# Patient Record
Sex: FEMALE | Race: BLACK OR AFRICAN AMERICAN | NOT HISPANIC OR LATINO | Employment: OTHER | URBAN - METROPOLITAN AREA
[De-identification: names, ages, dates, MRNs, and addresses within clinical notes are randomized per-mention and may not be internally consistent; named-entity substitution may affect disease eponyms.]

---

## 2017-01-17 ENCOUNTER — LAB VISIT (OUTPATIENT)
Dept: LAB | Facility: HOSPITAL | Age: 67
End: 2017-01-17
Payer: MEDICARE

## 2017-01-17 DIAGNOSIS — Z76.82 ORGAN TRANSPLANT CANDIDATE: ICD-10-CM

## 2017-01-17 PROCEDURE — 86829 HLA CLASS I/II ANTIBODY QUAL: CPT | Mod: PO

## 2017-01-17 PROCEDURE — 86829 HLA CLASS I/II ANTIBODY QUAL: CPT | Mod: 91,PO

## 2017-01-31 LAB — HPRA INTERPRETATION: NORMAL

## 2017-02-07 ENCOUNTER — LAB VISIT (OUTPATIENT)
Dept: LAB | Facility: HOSPITAL | Age: 67
End: 2017-02-07
Payer: MEDICARE

## 2017-02-07 DIAGNOSIS — Z76.82 ORGAN TRANSPLANT CANDIDATE: ICD-10-CM

## 2017-02-07 PROCEDURE — 86829 HLA CLASS I/II ANTIBODY QUAL: CPT | Mod: PO

## 2017-02-07 PROCEDURE — 86829 HLA CLASS I/II ANTIBODY QUAL: CPT | Mod: 91,PO

## 2017-02-17 LAB — HPRA INTERPRETATION: NORMAL

## 2017-03-16 ENCOUNTER — TELEPHONE (OUTPATIENT)
Dept: TRANSPLANT | Facility: CLINIC | Age: 67
End: 2017-03-16

## 2017-03-16 NOTE — TELEPHONE ENCOUNTER
"Phone Dialysis unit.  Hilaria states pt has "Greatly improved" in regards to signing off early.  Signed off early only once in Jan and once in Feb.    Will have pt scheduled for 6 mos re-eval.    "

## 2017-04-02 DIAGNOSIS — Z76.82 ORGAN TRANSPLANT CANDIDATE: Primary | ICD-10-CM

## 2017-04-02 NOTE — PROGRESS NOTES
YEARLY LIST MANAGEMENT NOTE    Yessi Valencia's kidney transplant listing status reviewed.  Patient is due for follow-up appointments in May 2017.  Appointments will be scheduled per protocol.  HLA sample is current and being rec'd on a regular basis.

## 2017-06-01 ENCOUNTER — LAB VISIT (OUTPATIENT)
Dept: LAB | Facility: HOSPITAL | Age: 67
End: 2017-06-01
Payer: MEDICARE

## 2017-06-01 DIAGNOSIS — Z76.82 ORGAN TRANSPLANT CANDIDATE: ICD-10-CM

## 2017-06-02 ENCOUNTER — HOSPITAL ENCOUNTER (OUTPATIENT)
Dept: RADIOLOGY | Facility: HOSPITAL | Age: 67
Discharge: HOME | End: 2017-06-02
Attending: INTERNAL MEDICINE
Payer: MEDICARE

## 2017-06-02 ENCOUNTER — OFFICE VISIT (OUTPATIENT)
Dept: OBSTETRICS AND GYNECOLOGY | Facility: CLINIC | Age: 67
End: 2017-06-02
Payer: MEDICARE

## 2017-06-02 ENCOUNTER — CLINICAL SUPPORT (OUTPATIENT)
Dept: CARDIOLOGY | Facility: CLINIC | Age: 67
End: 2017-06-02
Payer: MEDICARE

## 2017-06-02 VITALS
HEIGHT: 67 IN | SYSTOLIC BLOOD PRESSURE: 126 MMHG | DIASTOLIC BLOOD PRESSURE: 88 MMHG | WEIGHT: 209 LBS | BODY MASS INDEX: 32.8 KG/M2

## 2017-06-02 DIAGNOSIS — I36.9 NONRHEUMATIC TRICUSPID VALVE DISORDER: ICD-10-CM

## 2017-06-02 DIAGNOSIS — Z01.419 NORMAL GYNECOLOGIC EXAMINATION: ICD-10-CM

## 2017-06-02 DIAGNOSIS — Z01.89 ENCOUNTER FOR OTHER SPECIFIED SPECIAL EXAMINATIONS: ICD-10-CM

## 2017-06-02 DIAGNOSIS — Z76.82 ORGAN TRANSPLANT CANDIDATE: ICD-10-CM

## 2017-06-02 DIAGNOSIS — Z11.4 ENCOUNTER FOR SCREENING FOR HIV: ICD-10-CM

## 2017-06-02 DIAGNOSIS — Z12.31 ENCOUNTER FOR SCREENING MAMMOGRAM FOR BREAST CANCER: ICD-10-CM

## 2017-06-02 DIAGNOSIS — Z71.1 CONCERN ABOUT STD IN FEMALE WITHOUT DIAGNOSIS: Primary | ICD-10-CM

## 2017-06-02 LAB
DIASTOLIC DYSFUNCTION: YES
ESTIMATED PA SYSTOLIC PRESSURE: 38.28
RETIRED EF AND QEF - SEE NOTES: 60 (ref 55–65)
TRICUSPID VALVE REGURGITATION: ABNORMAL

## 2017-06-02 PROCEDURE — 87624 HPV HI-RISK TYP POOLED RSLT: CPT | Mod: TXP

## 2017-06-02 PROCEDURE — 88175 CYTOPATH C/V AUTO FLUID REDO: CPT | Performed by: PATHOLOGY

## 2017-06-02 PROCEDURE — 77067 SCR MAMMO BI INCL CAD: CPT | Mod: TC,TXP

## 2017-06-02 PROCEDURE — 77063 BREAST TOMOSYNTHESIS BI: CPT | Mod: 26,TXP,, | Performed by: RADIOLOGY

## 2017-06-02 PROCEDURE — 77067 SCR MAMMO BI INCL CAD: CPT | Mod: 26,TXP,, | Performed by: RADIOLOGY

## 2017-06-02 PROCEDURE — 88141 CYTOPATH C/V INTERPRET: CPT | Mod: ,,, | Performed by: PATHOLOGY

## 2017-06-02 PROCEDURE — G0101 CA SCREEN;PELVIC/BREAST EXAM: HCPCS | Mod: S$PBB,,, | Performed by: NURSE PRACTITIONER

## 2017-06-02 PROCEDURE — 93306 TTE W/DOPPLER COMPLETE: CPT | Mod: PBBFAC,TXP | Performed by: NUCLEAR MEDICINE

## 2017-06-02 PROCEDURE — 99999 PR PBB SHADOW E&M-EST. PATIENT-LVL II: CPT | Mod: PBBFAC,,, | Performed by: NURSE PRACTITIONER

## 2017-06-02 RX ORDER — LORATADINE 10 MG/1
10 TABLET ORAL
COMMUNITY
Start: 2017-01-25 | End: 2021-03-12

## 2017-06-02 RX ORDER — LACTULOSE 10 G/15ML
20 SOLUTION ORAL
COMMUNITY
Start: 2016-05-13 | End: 2017-06-02 | Stop reason: SDUPTHER

## 2017-06-02 RX ORDER — HYDROCODONE BITARTRATE AND ACETAMINOPHEN 10; 325 MG/1; MG/1
1 TABLET ORAL
COMMUNITY
Start: 2017-05-03 | End: 2017-06-02 | Stop reason: SDUPTHER

## 2017-06-02 RX ORDER — HYDRALAZINE HYDROCHLORIDE 100 MG/1
100 TABLET, FILM COATED ORAL
COMMUNITY
Start: 2016-08-01 | End: 2017-06-02 | Stop reason: SDUPTHER

## 2017-06-02 RX ORDER — CINACALCET HYDROCHLORIDE 30 MG/1
TABLET, COATED ORAL
COMMUNITY
Start: 2017-04-12 | End: 2021-03-12

## 2017-06-02 RX ORDER — CLONIDINE HYDROCHLORIDE 0.1 MG/1
0.1 TABLET ORAL
COMMUNITY
Start: 2017-06-01 | End: 2021-03-12

## 2017-06-02 RX ORDER — IRBESARTAN 300 MG/1
300 TABLET ORAL DAILY
COMMUNITY
Start: 2017-04-13 | End: 2021-03-12

## 2017-06-02 RX ORDER — PEN NEEDLE, DIABETIC 30 GX3/16"
NEEDLE, DISPOSABLE MISCELLANEOUS
COMMUNITY
Start: 2014-12-03

## 2017-06-02 RX ORDER — SEVELAMER CARBONATE 800 MG/1
800 TABLET, FILM COATED ORAL
COMMUNITY

## 2017-06-02 RX ORDER — CARVEDILOL 25 MG/1
25 TABLET ORAL
COMMUNITY
Start: 2017-04-13

## 2017-06-02 RX ORDER — ONDANSETRON 4 MG/1
4 TABLET, FILM COATED ORAL
COMMUNITY
Start: 2017-06-01 | End: 2018-06-01

## 2017-06-02 RX ORDER — OMEPRAZOLE 40 MG/1
40 CAPSULE, DELAYED RELEASE ORAL
COMMUNITY
Start: 2016-08-01 | End: 2017-06-02 | Stop reason: SDUPTHER

## 2017-06-02 RX ORDER — CLONIDINE 0.3 MG/24H
PATCH, EXTENDED RELEASE TRANSDERMAL
COMMUNITY
Start: 2017-04-28

## 2017-06-02 RX ORDER — INSULIN LISPRO 100 [IU]/ML
4 INJECTION, SOLUTION INTRAVENOUS; SUBCUTANEOUS
COMMUNITY
Start: 2017-02-27

## 2017-06-02 RX ORDER — CALCITRIOL 0.5 UG/1
0.5 CAPSULE ORAL
COMMUNITY

## 2017-06-02 NOTE — LETTER
June 2, 2017      Janay Vallejo NP  [address]  [place] [address]           [facility]  [address] [address]  Phone: [phone]  Fax: [phone]          Patient: Yessi Valencia   MR Number: [id number]   YOB: 1950   Date of Visit: 6/2/2017       Dear Janay Vallejo:    Thank you for referring Yessi Valencia to me for evaluation. Attached you will find relevant portions of my assessment and plan of care.    If you have questions, please do not hesitate to call me. I look forward to following Yessi Valencia along with you.    Sincerely,    Ingrid Oleary NP    Enclosure  CC:  No Recipients    If you would like to receive this communication electronically, please contact [email or web address] or [phone] to request more information on [employer] access.    For providers and/or their staff who would like to refer a patient to [facility], please contact us through our one-stop-shop provider referral line, Jose Sebastian, at [phone].    If you feel you have received this communication in error or would no longer like to receive these types of communications, please e-mail [email or web address]

## 2017-06-02 NOTE — PROGRESS NOTES
CC: Well woman exam    Yessi Valencia is a 66 y.o. female  presents for well woman exam.  LMP: No LMP recorded. Patient is postmenopausal..  No issues, problems, or complaints. No AUB. Patient has a h/o left breast cancer and is being followed  By oncology.Patient is on kidney HD and on list for transplant.     Past Medical History:   Diagnosis Date    Arthritis     Breast cancer s/p left mastectomy      No chemotherapy or XRT, rx'd with fumera x5 yrs    Chronic kidney disease (CKD), stage V     Diabetes mellitus, type 2 age 47 2015    Diabetic nephropathy     Encounter for blood transfusion     ESRD on dialysis     Heart murmur     Hypertension     Neuromuscular disorder     Seizures     Type II diabetes mellitus with neuropathy      Past Surgical History:   Procedure Laterality Date     SECTION      EYE SURGERY      MASTECTOMY Left      Social History     Social History    Marital status: Single     Spouse name: N/A    Number of children: N/A    Years of education: N/A     Occupational History    Not on file.     Social History Main Topics    Smoking status: Former Smoker    Smokeless tobacco: Never Used      Comment: quit 40+ years ago    Alcohol use No    Drug use: No    Sexual activity: Not on file     Other Topics Concern    Not on file     Social History Narrative    3 grankids. Retired .     Family History   Problem Relation Age of Onset    Hypertension Mother     Diabetes Mother     Cancer Mother      colon ca    Diabetes Father     Heart disease Father     Hypertension Father     Diabetes Sister     Diabetes Brother     Kidney disease Daughter      proteinuria    Diabetes Daughter      OB History      Para Term  AB Living    2     2    SAB TAB Ectopic Multiple Live Births                  Wt 94.8 kg (208 lb 15.9 oz)   BMI 32.73 kg/m²       ROS:  GENERAL: Denies weight gain or weight loss. Feeling well overall.   SKIN: Denies rash  or lesions.   HEAD: Denies head injury or headache.   NODES: Denies enlarged lymph nodes.   CHEST: Denies chest pain or shortness of breath.   CARDIOVASCULAR: Denies palpitations or left sided chest pain.   ABDOMEN: No abdominal pain, constipation, diarrhea, nausea, vomiting or rectal bleeding.   URINARY: No frequency, dysuria, hematuria, or burning on urination.  REPRODUCTIVE: See HPI.   BREASTS: HPI  HEMATOLOGIC: No easy bruisability or excessive bleeding.   MUSCULOSKELETAL: Denies joint pain or swelling.   NEUROLOGIC: Denies syncope or weakness.   PSYCHIATRIC: Denies depression, anxiety or mood swings.    PHYSICAL EXAM:  APPEARANCE: Well nourished, well developed, in no acute distress.  AFFECT: WNL, alert and oriented x 3  SKIN: No acne or hirsutism  NECK: Neck symmetric without masses or thyromegaly  NODES: No inguinal, cervical, axillary, or femoral lymph node enlargement  CHEST: Good respiratory effect  ABDOMEN: Soft.  No tenderness or masses.  PELVIC: Normal external genitalia without lesions.  Normal hair distribution.  Adequate perineal body, normal urethral meatus.  Vagina atrophy.Cervix pink, without lesions, discharge or tenderness.  No significant cystocele or rectocele.  Bimanual exam shows uterus to be normal size, regular, mobile and nontender.  Adnexa without masses or tenderness.    EXTREMITIES: No edema.    PLAN:  Pap exam  STD work-up  Patient was counseled today on A.C.S. Pap guidelines and recommendations for yearly pelvic exams, mammograms and monthly self breast exams; to see her PCP for other health maintenance.

## 2017-06-03 LAB
C TRACH DNA SPEC QL NAA+PROBE: NOT DETECTED
N GONORRHOEA DNA SPEC QL NAA+PROBE: NOT DETECTED

## 2017-06-05 NOTE — PROGRESS NOTES
Please send the echo report to the nephrologist for adjustment of fluid removal/diuresis and BP control

## 2017-06-14 LAB
HPV HR 12 DNA CVX QL NAA+PROBE: NEGATIVE
HPV16 DNA SPEC QL NAA+PROBE: NEGATIVE
HPV18 DNA SPEC QL NAA+PROBE: NEGATIVE

## 2017-06-15 NOTE — PROGRESS NOTES
Please call patient and inform her that pap exam was negative for HPV but positive for ASC-US. Recommendation is to repeat pap exam in 1 year, even though she is 66.

## 2017-07-07 PROCEDURE — 86829 HLA CLASS I/II ANTIBODY QUAL: CPT | Mod: 91,PO,TXP

## 2017-07-07 PROCEDURE — 86829 HLA CLASS I/II ANTIBODY QUAL: CPT | Mod: PO,TXP

## 2017-07-11 NOTE — Clinical Note
July 11, 2017        Yessi Valencia  [address]  [place] [address]         Dear Yessi Valencia:    As part of our commitment to share important information with our transplant patients, we want to provide you with the most current kidney and kidney/pancreas transplant outcomes at the [facility] as published bi-annually by the Scientific Registry for Organ Recipients (SRTR).    For kidney transplants performed between 1/1/2014 and 6/30/2016 the 1-year patient and graft survival rates are as follows:   Kidney-Cadaveric Donor Kidney-Living Donor Kidney/Pancreas   Adults [facility] 1-Year Expected 1-Year National 1-Year [facility] 1-Year Expected 1-Year National 1-Year [facility] 1-Year Expected 1-Year National 1-Year   Graft Survival 93.09% 94.31% 93.92% 97.83% 97.54% 97.75% 98.18% 96.73% 96.15%   Patient Survival 96.56% 96.91% 96.57% 100% 98.88% 98.83% 98.18% 97.52% 97.54%         To learn more about transplant outcomes in the [place] you can go to www.srtr.org.     Please call the Kidney Transplant Office at [phone] or [phone] should you have any questions or if:     Your insurance changes in any way   Your address or phone number changes   There are changes in your health   You are in the hospital or Emergency Room for any reason      Sincerely,  Kidney Transplant Team

## 2017-07-11 NOTE — ADDENDUM NOTE
Encounter addended by: Mare Tejada on: 7/11/2017  3:28 PM<BR>    Actions taken: Letter status changed

## 2017-07-11 NOTE — LETTER
IMPORTANT      July 11, 2017    Yessi Valencia  [address] [place] [address]     Re: [id number]    Dear Mr/Mrs Valencia,    Thank you for choosing [facility] as your health care provider.  We are committed to assisting you with timely insurance filing and payment of your account.  To protect your liability, updated insurance information must be given to us at the time of service and we should be notified immediately if      · Your insurance benefits/plan changes.   You become eligible for any other benefits   Your current plan/coverage terms.    Also, please bring in a copy of your insurance premium payment if you have one of the following types of insurance:    · Coverage from a alf plan.  · Coverage from the Affordable Healthcare Act Plan.  · Coverage from a COBRA plan  · Premium paid by the National Kidney Foundation.    To ensure we have the correct insurance (Medical/Pharmacy) on file and to answer any questions regarding your benefits, please call us at [phone] or [phone] and ask to speak to the kidney  indicated below:      Transplant Dept  [facility]   Heart   Brynn Baron   Kidney (A-K) and Lung  [place] Ashu    Kidney (L-Z)  Briana Alexander   Liver    We look forward to hearing from you soon.    Sincerely,      Transplant Financial Services

## 2017-07-12 LAB — HPRA INTERPRETATION: NORMAL

## 2017-07-13 ENCOUNTER — LAB VISIT (OUTPATIENT)
Dept: LAB | Facility: HOSPITAL | Age: 67
End: 2017-07-13
Payer: MEDICARE

## 2017-07-13 DIAGNOSIS — Z76.82 ORGAN TRANSPLANT CANDIDATE: ICD-10-CM

## 2017-07-13 PROCEDURE — 86829 HLA CLASS I/II ANTIBODY QUAL: CPT | Mod: 91,PO,TXP

## 2017-07-13 PROCEDURE — 86829 HLA CLASS I/II ANTIBODY QUAL: CPT | Mod: PO,TXP

## 2017-08-01 DIAGNOSIS — Z76.82 ORGAN TRANSPLANT CANDIDATE: ICD-10-CM

## 2017-08-09 LAB — HPRA INTERPRETATION: NORMAL

## 2017-08-17 ENCOUNTER — LAB VISIT (OUTPATIENT)
Dept: LAB | Facility: HOSPITAL | Age: 67
End: 2017-08-17
Payer: MEDICARE

## 2017-08-17 DIAGNOSIS — Z76.82 ORGAN TRANSPLANT CANDIDATE: ICD-10-CM

## 2017-08-17 PROCEDURE — 86829 HLA CLASS I/II ANTIBODY QUAL: CPT | Mod: PO,TXP

## 2017-08-17 PROCEDURE — 86829 HLA CLASS I/II ANTIBODY QUAL: CPT | Mod: 91,PO,TXP

## 2017-08-25 ENCOUNTER — HOSPITAL ENCOUNTER (OUTPATIENT)
Dept: RADIOLOGY | Facility: HOSPITAL | Age: 67
Discharge: HOME | End: 2017-08-25
Attending: INTERNAL MEDICINE
Payer: MEDICARE

## 2017-08-25 ENCOUNTER — OFFICE VISIT (OUTPATIENT)
Dept: TRANSPLANT | Facility: CLINIC | Age: 67
End: 2017-08-25
Payer: MEDICARE

## 2017-08-25 VITALS
SYSTOLIC BLOOD PRESSURE: 176 MMHG | HEART RATE: 90 BPM | RESPIRATION RATE: 16 BRPM | DIASTOLIC BLOOD PRESSURE: 85 MMHG | HEIGHT: 65 IN | BODY MASS INDEX: 34.24 KG/M2 | OXYGEN SATURATION: 97 % | WEIGHT: 205.5 LBS | TEMPERATURE: 98 F

## 2017-08-25 DIAGNOSIS — Z99.2 ESRD ON DIALYSIS: Primary | Chronic | ICD-10-CM

## 2017-08-25 DIAGNOSIS — N18.6 ESRD ON DIALYSIS: Primary | Chronic | ICD-10-CM

## 2017-08-25 DIAGNOSIS — Z76.82 ORGAN TRANSPLANT CANDIDATE: ICD-10-CM

## 2017-08-25 DIAGNOSIS — Z22.7 LATENT TUBERCULOSIS BY BLOOD TEST: ICD-10-CM

## 2017-08-25 DIAGNOSIS — E11.21 DIABETIC NEPHROPATHY ASSOCIATED WITH TYPE 2 DIABETES MELLITUS: Chronic | ICD-10-CM

## 2017-08-25 DIAGNOSIS — E66.9 OBESITY (BMI 30.0-34.9): ICD-10-CM

## 2017-08-25 DIAGNOSIS — I10 ESSENTIAL HYPERTENSION: ICD-10-CM

## 2017-08-25 PROCEDURE — 1125F AMNT PAIN NOTED PAIN PRSNT: CPT | Mod: TXP,,, | Performed by: INTERNAL MEDICINE

## 2017-08-25 PROCEDURE — 99213 OFFICE O/P EST LOW 20 MIN: CPT | Mod: PBBFAC,25,TXP

## 2017-08-25 PROCEDURE — 71020 XR CHEST PA AND LATERAL: CPT | Mod: 26,TXP,, | Performed by: RADIOLOGY

## 2017-08-25 PROCEDURE — 99215 OFFICE O/P EST HI 40 MIN: CPT | Mod: S$PBB,TXP,, | Performed by: INTERNAL MEDICINE

## 2017-08-25 PROCEDURE — 4010F ACE/ARB THERAPY RXD/TAKEN: CPT | Mod: TXP,,, | Performed by: INTERNAL MEDICINE

## 2017-08-25 PROCEDURE — 76770 US EXAM ABDO BACK WALL COMP: CPT | Mod: 26,GC,TXP, | Performed by: RADIOLOGY

## 2017-08-25 PROCEDURE — 99999 PR PBB SHADOW E&M-EST. PATIENT-LVL III: CPT | Mod: PBBFAC,TXP,,

## 2017-08-25 PROCEDURE — 1159F MED LIST DOCD IN RCRD: CPT | Mod: TXP,,, | Performed by: INTERNAL MEDICINE

## 2017-08-25 NOTE — PROGRESS NOTES
"   Kidney Transplant Recipient Reevalulation    Referring Physician: Oliver Cardona  Current Nephrologist: Oliver Cardona  Waitlist Status: inactive  Dialysis Start Date: 6/3/2015    Subjective:     CC:  Annual reassessment of kidney transplant candidacy.    HPI:  Ms. Valencia is a 67 y.o. year old Black or  female with ESRD secondary to diabetic nephropathy.  She has been on the wait list for a kidney transplant at [facility] since 6/3/2015. Patient is currently on hemodialysis started on 6/2015. Patient is dialyzing on TTS schedule.  Patient reports that she is tolerating dialysis well.. She has a RUE AV fistula. Patient denies any recent hospitalizations or ED visits.    The patient is here with her daughter, who is a care giver (daughter has a baby with her). She also names other daughter as a possible care giver.    Patient states that she is adherent to dialysis and she was "put on probation last year" but now she is doing fine and not missing any treatments.    She had a skin lesion removed by Dr Gardner () and is positive that was benign but we don't have any records available    She refers being active and motivated with the transplant process.  I reviewed the tests available and they are within the acceptable level. No major c/v barriers that I can see, I discussed this with the patient.    Her current BMI is 34 and she willing to lose more weight     Patient had a mammogram recently which was benign (she has h/o breast cancer in 2007, no evidence of recurrence so far)    Patient had a procedure to her dialysis access in July 17 2017 to "remove a aneurysm" and now she is fully recovered and has a patent dialysis access.    She is dialyzing three times a week and refers some episodes of weakness after dialysis because they "?remove too much fluid". She denies excessive fluid intake. No chest pain or SOB      Review of Systems   Constitutional: Negative for appetite change, fatigue and " "fever.   HENT: Negative for hearing loss, mouth sores and sore throat.    Eyes: Negative for photophobia and visual disturbance.   Respiratory: Negative for cough, chest tightness, shortness of breath and wheezing.    Cardiovascular: Negative for chest pain, palpitations and leg swelling.   Gastrointestinal: Negative for abdominal distention, abdominal pain, blood in stool, constipation, diarrhea, nausea and vomiting.   Genitourinary: Negative for decreased urine volume, difficulty urinating, dysuria, frequency, hematuria, menstrual problem and urgency.        Still makes urine 3 to 5 times a day   Musculoskeletal: Negative for arthralgias, back pain, gait problem and joint swelling.   Skin: Negative for pallor, rash and wound.   Neurological: Negative for dizziness, tremors, syncope, weakness, light-headedness and headaches.   Hematological: Negative for adenopathy. Does not bruise/bleed easily.   Psychiatric/Behavioral: Negative for confusion, dysphoric mood and sleep disturbance. The patient is not nervous/anxious.             Objective:   body mass index is 34.46 kg/m².    Physical Exam   Constitutional: She is oriented to person, place, and time. She appears well-developed and well-nourished. No distress.   BP (!) 176/85 (BP Location: Left arm, Patient Position: Sitting, BP Method: Medium (Automatic)) Comment: Patient has not take BP medicine yet today  Pulse 90   Temp 98 °F (36.7 °C) (Oral)   Resp 16   Ht 5' 4.75" (1.645 m)   Wt 93.2 kg (205 lb 7.5 oz)   SpO2 97%   BMI 34.46 kg/m²      HENT:   Head: Normocephalic and atraumatic.   Right Ear: External ear normal.   Left Ear: External ear normal.   Nose: Nose normal.   Mouth/Throat: Oropharynx is clear and moist. No oropharyngeal exudate.   Eyes: Conjunctivae and EOM are normal. Pupils are equal, round, and reactive to light.   Neck: Normal range of motion. Neck supple. No JVD present. No thyromegaly present.   Cardiovascular: Normal rate, regular rhythm, " normal heart sounds and intact distal pulses.  Exam reveals no gallop and no friction rub.    No murmur heard.  RUE AVG   Pulmonary/Chest: Effort normal and breath sounds normal. No respiratory distress. She has no wheezes. She has no rales.   Abdominal: Soft. Bowel sounds are normal. She exhibits no distension and no mass. There is no tenderness. There is no rebound and no guarding.   Musculoskeletal: Normal range of motion. She exhibits no edema or tenderness.   Neurological: She is alert and oriented to person, place, and time. She has normal reflexes. She displays normal reflexes. No cranial nerve deficit. She exhibits normal muscle tone. Coordination normal.   Skin: Skin is warm and dry. No rash noted. No pallor.   Psychiatric: She has a normal mood and affect. Her behavior is normal. Judgment and thought content normal.   Nursing note and vitals reviewed.      Labs:  Lab Results   Component Value Date    WBC 7.35 05/27/2015    HGB 8.9 (L) 05/27/2015    HCT 28.8 (L) 05/27/2015     05/27/2015    K 4.3 05/27/2015     05/27/2015    CO2 25 05/27/2015    BUN 72 (H) 05/27/2015    CREATININE 4.5 (H) 05/27/2015    EGFRNONAA 9.8 (A) 05/27/2015    CALCIUM 9.0 05/27/2015    PHOS 4.5 05/27/2015    ALBUMIN 2.9 (L) 05/27/2015    AST 19 05/27/2015    ALT 26 05/27/2015    .0 (H) 07/13/2015       No results found for: PREALBUMIN, BILIRUBINUA, GGT, AMYLASE, LIPASE, PROTEINUA, NITRITE, RBCUA, WBCUA    No results found for: HLAABCTYPE    Lab Results   Component Value Date    CPRA 22 06/14/2017    BR1ZATX A3 06/14/2017    CIABCLM A*34:01 06/14/2017    CIIAB Negative 03/07/2017       Labs were reviewed with the patient.    Pre-transplant Workup:   Reviewed with the patient.    Assessment:     ESRD  Type 2 DM, BMI 34  No a pancreas transplant candidate  Diabetic nephropathy  Obesity  Essential Hypertension      Plan:     Transplant Candidacy:   Ms. Valencia is a suitable kidney transplant candidate.  She remains in  overall stable health, and will remain active on the transplant list, will just make sure we review records from skin lesion removed in June 2017 to r/o melanoma or SCC. And also will try to get redords from Urology consult requested in 2015 (please see below)    Patient and dialysis unit state that she is adherent to treatments    Will try to get records from skin lesion procedure to r/o melanoma or squamous cell cancer     patient already cleared by Oncology due to h/o Breast cancer in 2007    In 2015 patient had CT of the abdomen that showed bilateral hydronephrosis and hydroureter with some thickening and trabeculation of the bladder. . A urology consult was recommended but I cant find records of this, however her kidney US from today showed normal size kidney and normal bladder (?).     Will ask our transplant nurse to see if we have any records available addressing this issues        Carlos Mcintosh MD       Follow-up:   In addition to the tests noted in the plan, Ms. Valencia will continue to have reevaluation as per the standing pre-kidney transplant protocol:  1. Monthly blood for PRA  2. Annual return to clinic, except HIV positive, > 65 years of age, or pancreas transplant candidates who will be scheduled to see transplant every 6 months while in pre-transplant phase  3. Annual re-testing: CXR, EKG, yearly mammograms for women over 40 and PSA for males over 40, cardiology follow-up as recommended by initial cardiology pre-transplant evaluation  4. Renal ultrasound every 2 years  5. Baseline colonoscopy after age 50 and repeated as recommended    UNOS Patient Status  Functional Status: 60% - Requires occasional assistance but is able to care for needs  Physical Capacity: Limited Mobility

## 2017-08-25 NOTE — PROGRESS NOTES
Patient was seen in listed '[facility]' transplant clinic for continued evaluation for kidney, kidney/pancreas or pancreas only transplant. The patient attended a group education session that discussed/reviewed the following aspects of transplantation: evaluation and selection committee process, UNOS waitlist management/multiple listings, types of organs offered (KDPI < 85%, KDPI > 85%, PHS increased risk, DCD), financial aspects, surgical procedures, dietary instruction pre- and post-transplant, health maintenance pre- and post-transplant, post-transplant hospitalization and outpatient follow-up, potential to participate in a research protocol, and medication management and side effects. A question and answer session was provided after the presentation.     The patient was seen by all members of the multi-disciplinary team to include: Nephrologist/PA, , Transplant Coordinator, and .      The patient reviewed and signed all consents for evaluation which were witnessed and sent to scanning into the EPIC chart.     The patient was given an education book and plan for further evaluation based on her individual assessment.      The patient was encouraged to call with any questions or concerns.

## 2017-09-11 LAB — HPRA INTERPRETATION: NORMAL

## 2017-09-11 NOTE — PROGRESS NOTES
Transplant Recipient Adult Psychosocial Assessment Update    Yessi Valencia  [address]  [place] [address]    No relevant phone numbers on file.   Home  [phone] (home)   Cell  [phone]  Work  There is no work phone number on file.  E-mail  [email or web address]    Sex: female  YOB: 1950  Age: 67 y.o.    Encounter Date: 2017  [place] Citizen: yes  Primary Language: English   Needed: no    Emergency Contact:  Name: Linda Valencia  Relationship: daughter  Address:  [address]; [place] [address]  Phone Numbers:  [phone] (work), [phone] (mobile)  [phone] (home)    Family/Social Support:   Number of dependents/: Pt reports no dependents.  Marital history: Pt reports never being  and no current significant other. Not currently in a relationship.  Other family dynamics: Pt reports 2 adult children: Linda and Deshawn; 2 sisters, one of whom  last October; 2 brothers: Rolando and Reji. Pt reports having 3 grandchildren.  She states her sister is unable to assist as she cares for grandchildren and a sick . Both brothers are disabled.     Household Composition:    Pt, her dtr, 46 y/o Ambika Valencia and grndtr, 3 y/o Jaky all live together.  Ambika drives.       Do you and your caregivers have access to reliable transportation? yes     PRIMARY CAREGIVER: Linda Valencia will be primary caregiver, phone number [phone].   provided in-depth information to patient regarding pre- and post-transplant caregiver role.  Patient and dtr verbalizes understanding of the education provided today.   strongly encourages patient and caregiver to have concrete plan regarding post-transplant care giving, including back-up caregiver(s) to ensure care giving needs are met as needed.  Patient verbalizes understanding of caregiver responsibilities.        provided in-depth information to patient  and caregiver regarding pre- and post-transplant caregiver role.   strongly encourages patient and caregiver to have concrete plan regarding post-transplant care giving, including back-up caregiver(s) to ensure care giving needs are met as needed.    Patient and Caregiver states understanding all aspects of caregiver role/commitment and is able/willing/committed to being caregiver to the fullest extent necessary.    Patient and Caregiver verbalizes understanding of the education provided today and caregiver responsibilities.         remains available. Patient and Caregiver agree to contact  in a timely manner if concerns arise.      Able to take time off work without financial concerns: yes     Additional Significant Others who will Assist with Transplant:  Name: Deshawn Mendez   Age: 27  Phone: [phone]  City: [place] State: [place]  Relationship: daughter  Does person drive? yes    Living Will: no Education and information provided to pt.  Healthcare Power of : no Education and information provided to pt.  Advance Directives on file: <<no information> per medical record.  Verbally reviewed LW/HCPA information.   provided patient with copy of LW/HCPA documents and provided education on completion of forms.  Pt stated she would like Linda to be primary HCPOA with Deshawn giron as secondary.    Living Donors: No. Education and resource information given to patient.    Highest Education Level: Attended College/Technical School  Reading Ability: college  Reports difficulty with: seeing. Pt reports issues with eyesight in left eye and is related to pt's diabetes. Pt wears glasses.  Learns Best By:  Pt reports learning best by written and hands-on instruction.     Status: no  VA Benefits: no     Working for Income: Yes  If no, reason not working: Patient Choice - Retired  Patient is currently employed as part time  at [employer].      Spouse/Significant Other Employment: Pt reports no current significant other.    Disabled: yes: date disability began: 2014, due to: ESRD and diabetes.  She is now on Social Security group home.    Monthly Income:  SSI: $832  Able to afford all costs now and if transplanted, including medications: yes Pt confirms previous reports of pt's daughter  will work on fundraising to assist with the financial aspect of transplant. Pt denies any fundraising done to date.  Patient and Caregiver verbalizes understanding of personal responsibilities related to transplant costs and the importance of having a financial plan to ensure that patients transplant costs are fully covered.       provided fundraising information/education. Patient and Caregiververbalizes understanding.   remains available.    Insurance:   Payor/Plan Subscr  Sex Relation Sub. Ins. ID Effective Group Num   1. MEDICARE - [place]* KANWAL EMERSON 1950 Female  [id number] 9/1/15                                    [address]   2. [employer] CROSS * KANWAL EMERSON 1950 Female  [id number]                                   [address] [address]       2. BLUE CROSS BL* KANWAL EMERSON 1950 Female  [id number]                                   [address] [address]       Primary Insurance (for UNOS reporting): Public Insurance - Medicare FFS (Fee For Service)  Secondary Insurance (for UNOS reporting): Private Insurance  Pt reports pt's BCBS premium is being paid through [facility] (apprxly $150). SW provided education re:  Pt needing to pay for own premium post transplant and fundraising.  Pt verbalizes understanding.  SW remains available    Patient and Caregiver verbalizes clear understanding that patient may experience difficulty obtaining and/or be denied insurance coverage post-surgery. This includes and is not limited to disability insurance, life insurance, health insurance, burial insurance,  "long term care insurance, and other insurances.      Patient and Caregiver also reports understanding that future health concerns related to or unrelated to transplantation may not be covered by patient's insurance.  Resources and information provided and reviewed.     Patient and Caregiver provides verbal permission to release any necessary information to outside resources for patient care and discharge planning.  Resources and information provided are reviewed.      Dialysis Adherence: Patient reports adherence with all aspects of plan or care, including medications, appointments, diet, and dialysis.   SW spoke with Irene De Guzman who stated, "She comes to every treatment and has signed off 15 minutes early four times in the past three months." .  No psychosocial concerns.    Infusion Service: patient utilizing? no  Home Health: patient utilizing? no  DME: diabetic supplies and BP cuff  Pulmonary/Cardiac Rehab: Pt denies   ADLS:  Pt reports difficulty with driving due to eyesight, with walking due to neuropathy. Pt reports no difficulties with bathing, cooking, housekeeping, eating, shopping, and taking medication.    Adherence:   Pt reports good adherence with medications, dialysis, and health regimen.  Adherence education and counseling provided.     Per History Section:  Past Medical History:   Diagnosis Date    Arthritis     Breast cancer s/p left mastectomy 2007     No chemotherapy or XRT, rx'd with fumera x5 yrs    Chronic kidney disease (CKD), stage V     Diabetes mellitus, type 2 age 47 5/27/2015    Diabetic nephropathy     Encounter for blood transfusion     ESRD on dialysis     Heart murmur     Hypertension     Neuromuscular disorder     Obesity (BMI 30.0-34.9) 8/25/2017    Seizures     Type II diabetes mellitus with neuropathy      History   Substance Use Topics    Smoking status: Former Smoker    Smokeless tobacco: Never Used      Comment: quit 40+ years ago    Alcohol Use: No "          Comment: Pt reports last drinking 5 years ago and reports only drinking socially at that time  History   Drug Use No     History   Sexual Activity    Sexual activity: Not on file       Per Today's Psychosocial:  Tobacco: none, patient denies any use.  Alcohol: Pt reports last drinking 5 years ago and reports only drinking socially at that time. .  Illicit Drugs/Non-prescribed Medications: none, patient denies any use.    Patient states clear understanding of the potential impact of substance use as it relates to transplant candidacy and is aware of possible random substance screening.  Substance abstinence/cessation counseling, education and resources provided and reviewed.     Arrests/DWI/Treatment/Rehab: patient denies    Psychiatric History:    Mental Health: Pt reports no history of or current mental health issues or concerns.   Psychiatrist/Counselor: Pt denies seeing a mental health professional.  Medications:  Pt denies taking medications for mental health reasons.  Suicide/Homicide Issues: Pt denies any history of or current suicidal or homicidal ideations.    Safety at home: Pt reports no safety concerns in household.    Knowledge: Patient and Caregiver states having clear understanding and realistic expectations regarding the potential risks and potential benefits of organ transplantation and organ donation and agrees to discuss with health care team members and support system members, as well as to utilize available resources and express questions and/or concerns in order to further facilitate the pt informed decision-making.  Resources and information provided and reviewed.    Patient and Caregiver is aware of [facility]'s affiliation and/or partnership with agencies in home health care, LTAC, SNF, [facility], and other hospitals and clinics.    Understanding: Patient and Caregiver reports having a clear understanding of the many lifetime commitments involved with being a transplant recipient, including  costs, compliance, medications, lab work, procedures, appointments, concrete and financial planning, preparedness, timely and appropriate communication of concerns, abstinence (ETOH, tobacco, illicit non-prescribed drugs), adherence to all health care team recommendations, support system and caregiver involvement, appropriate and timely resource utilization and follow-through, mental health counseling as needed/recommended, and patient and caregiver responsibilities.  Social Service Handbook, resources and detailed educational information provided and reviewed.  Educational information provided.    Patient reports a clear understanding that risks and benefits may be involved with organ transplantation and with organ donation.       Patient also reports clear understanding that psychosocial risk factors may affect patient, and include but are not limited to feelings of depression, generalized anxiety, anxiety regarding dependence on others, post traumatic stress disorder, feelings of guilt and other emotional and/or mental concerns, and/or exacerbation of existing mental health concerns.  Detailed resources provided and discussed.      Patient agrees to access appropriate resources in a timely manner as needed and/or as recommended, and to communicate concerns appropriately.  Patient also reports a clear understanding of treatment options available.     Patient verbalized understanding of the expectations of the transplant process. SW educated pt on mental health concerns as it pertains to the transplant process. Pt reports being open to seeing psych for talk therapy and medications if necessary and agrees to contact the transplant team if the process becomes too overwhelming. SW provided education and emotional support regarding the transplant process. SW remains available.    Patient received education in a group setting.   reviewed education, provided additional information, and answered  "questions.    Feelings or Concerns: Pt denies having any concerns and/or overwhelming feelings regarding transplant at this time. Pt reports high motivation to pursue organ transplant at this time.    Coping: Pt reports coping well with the transplant process at this time and reports reading, going to social events, walking, taking grandkids to park, working part time, watching tv, listening to gospel music and sewing as ways to cope. Pt [facility] in [place], [place] with Dr. Celso Vazquez presiding. "I trust in God."    Goals: Pt reports "living a long healthy life" as a goal for after transplant.  SW provided support and education. Pt verbalizes understanding that transplant is not a guarantee of ending dialysis and life after transplantation will be a "new normal" post transplant.  SW remains available.    Interview Behavior: Patient and Caregiver presents as alert and oriented x 4, good eye contact, calm and communicative. She was accompanied by her dtr, Linda who presented as supportive of pt's pursuit of transplant.          Transplant Social Work - Candidacy  Assessment/Plan:     Psychosocial Suitability: Patient presents as an acceptable candidate for kidney transplant at this time. Based on psychosocial risk factors, patient presents as medium, due to history of non-adherence to dialysis and limited caregiver system.      Recommendations/Additional Comments:  counseled patient extensively on fundraising, housing, transportation, caregiver plan and adherence.   recommends fundraising, caregiver plan and adherence.  Patient [facility].   Patient verbalizes understanding.   remains available.      Davida Avilez LCSW           "

## 2017-09-13 ENCOUNTER — TELEPHONE (OUTPATIENT)
Dept: TRANSPLANT | Facility: CLINIC | Age: 67
End: 2017-09-13

## 2017-09-13 NOTE — TELEPHONE ENCOUNTER
Compliance check:  Dialysis unit reports in the past three months pt has had two no shows due to illness (8/19/17 and 7/20/17), four AMAs due to illness and clotting, none over 15 minutes, labs PTH 1111 on 8/10/17, transportation no concerns and family support no concerns.    Reported by fax back form

## 2017-09-21 ENCOUNTER — LAB VISIT (OUTPATIENT)
Dept: LAB | Facility: HOSPITAL | Age: 67
End: 2017-09-21
Payer: MEDICARE

## 2017-09-21 DIAGNOSIS — Z76.82 ORGAN TRANSPLANT CANDIDATE: ICD-10-CM

## 2017-09-21 PROCEDURE — 86833 HLA CLASS II HIGH DEFIN QUAL: CPT | Mod: PO,TXP

## 2017-09-21 PROCEDURE — 86832 HLA CLASS I HIGH DEFIN QUAL: CPT | Mod: PO,TXP

## 2017-10-16 LAB — HPRA INTERPRETATION: NORMAL

## 2017-10-21 LAB
CLASS I ANTIBODIES - LUMINEX: NORMAL
CLASS II ANTIBODIES - LUMINEX: NEGATIVE
CPRA %: 1
SERUM COLLECTION DT - LUMINEX CLASS I: NORMAL
SERUM COLLECTION DT - LUMINEX CLASS II: NORMAL
SPCL1 TESTING DATE: NORMAL
SPCL2 TESTING DATE: NORMAL
SPCLU TESTING DATE: NORMAL

## 2018-01-02 ENCOUNTER — LAB VISIT (OUTPATIENT)
Dept: LAB | Facility: HOSPITAL | Age: 68
End: 2018-01-02
Payer: MEDICARE

## 2018-01-02 DIAGNOSIS — Z76.82 ORGAN TRANSPLANT CANDIDATE: ICD-10-CM

## 2018-01-02 PROCEDURE — 86833 HLA CLASS II HIGH DEFIN QUAL: CPT | Mod: PO,TXP

## 2018-01-02 PROCEDURE — 86832 HLA CLASS I HIGH DEFIN QUAL: CPT | Mod: PO,TXP

## 2018-01-04 LAB — HPRA INTERPRETATION: NORMAL

## 2018-01-19 LAB
CLASS I ANTIBODY COMMENTS - LUMINEX: NORMAL
CLASS II ANTIBODIES - LUMINEX: NORMAL
CPRA %: 0
SERUM COLLECTION DT - LUMINEX CLASS I: NORMAL
SERUM COLLECTION DT - LUMINEX CLASS II: NORMAL
SPCL1 TESTING DATE: NORMAL
SPCL2 TESTING DATE: NORMAL
SPCLU TESTING DATE: NORMAL

## 2018-02-28 DIAGNOSIS — I13.10 HYPERTENSIVE HEART AND CHRONIC KIDNEY DISEASE: ICD-10-CM

## 2018-02-28 DIAGNOSIS — Z76.82 ORGAN TRANSPLANT CANDIDATE: Primary | ICD-10-CM

## 2018-02-28 NOTE — PROGRESS NOTES
YEARLY LIST MANAGEMENT NOTE    Yessi Valencia's kidney transplant listing status reviewed.  Patient is due for follow-up appointments in April 2018.  Appointments will be scheduled per protocol.  HLA sample is current and being rec'd on a regular basis.

## 2018-03-16 ENCOUNTER — LAB VISIT (OUTPATIENT)
Dept: LAB | Facility: HOSPITAL | Age: 68
End: 2018-03-16
Payer: MEDICARE

## 2018-03-16 DIAGNOSIS — Z76.82 ORGAN TRANSPLANT CANDIDATE: ICD-10-CM

## 2018-03-16 PROCEDURE — 86832 HLA CLASS I HIGH DEFIN QUAL: CPT | Mod: PO,TXP

## 2018-03-16 PROCEDURE — 86833 HLA CLASS II HIGH DEFIN QUAL: CPT | Mod: PO,TXP

## 2018-03-21 LAB — HPRA INTERPRETATION: NORMAL

## 2018-04-02 LAB
CLASS I ANTIBODY COMMENTS - LUMINEX: NORMAL
CLASS II ANTIBODIES - LUMINEX: NEGATIVE
CPRA %: 0
SERUM COLLECTION DT - LUMINEX CLASS I: NORMAL
SERUM COLLECTION DT - LUMINEX CLASS II: NORMAL
SPCL1 TESTING DATE: NORMAL
SPCL2 TESTING DATE: NORMAL
SPCLU TESTING DATE: NORMAL

## 2018-05-11 ENCOUNTER — OFFICE VISIT (OUTPATIENT)
Dept: TRANSPLANT | Facility: CLINIC | Age: 68
End: 2018-05-11
Payer: MEDICARE

## 2018-05-11 ENCOUNTER — HOSPITAL ENCOUNTER (OUTPATIENT)
Dept: CARDIOLOGY | Facility: CLINIC | Age: 68
Discharge: HOME | End: 2018-05-11
Attending: INTERNAL MEDICINE
Payer: MEDICARE

## 2018-05-11 ENCOUNTER — CLINICAL SUPPORT (OUTPATIENT)
Dept: CARDIOLOGY | Facility: CLINIC | Age: 68
End: 2018-05-11
Attending: INTERNAL MEDICINE
Payer: MEDICARE

## 2018-05-11 VITALS
SYSTOLIC BLOOD PRESSURE: 159 MMHG | RESPIRATION RATE: 16 BRPM | HEART RATE: 84 BPM | OXYGEN SATURATION: 99 % | TEMPERATURE: 98 F | HEIGHT: 64 IN | WEIGHT: 204.38 LBS | DIASTOLIC BLOOD PRESSURE: 80 MMHG | BODY MASS INDEX: 34.89 KG/M2

## 2018-05-11 DIAGNOSIS — E66.9 OBESITY (BMI 30.0-34.9): ICD-10-CM

## 2018-05-11 DIAGNOSIS — N32.0 BLADDER OUTLET OBSTRUCTION: ICD-10-CM

## 2018-05-11 DIAGNOSIS — Z22.7 LATENT TUBERCULOSIS BY BLOOD TEST: ICD-10-CM

## 2018-05-11 DIAGNOSIS — I10 ESSENTIAL HYPERTENSION: ICD-10-CM

## 2018-05-11 DIAGNOSIS — E11.21 TYPE 2 DIABETES MELLITUS WITH DIABETIC NEPHROPATHY, UNSPECIFIED WHETHER LONG TERM INSULIN USE: Chronic | ICD-10-CM

## 2018-05-11 DIAGNOSIS — N18.6 ESRD ON DIALYSIS: Primary | Chronic | ICD-10-CM

## 2018-05-11 DIAGNOSIS — Z76.82 ORGAN TRANSPLANT CANDIDATE: ICD-10-CM

## 2018-05-11 DIAGNOSIS — I35.9 NONRHEUMATIC AORTIC VALVE DISORDER: ICD-10-CM

## 2018-05-11 DIAGNOSIS — E11.21 DIABETIC NEPHROPATHY ASSOCIATED WITH TYPE 2 DIABETES MELLITUS: Chronic | ICD-10-CM

## 2018-05-11 DIAGNOSIS — Z99.2 ESRD ON DIALYSIS: Primary | Chronic | ICD-10-CM

## 2018-05-11 DIAGNOSIS — I13.10 HYPERTENSIVE HEART AND CHRONIC KIDNEY DISEASE: ICD-10-CM

## 2018-05-11 DIAGNOSIS — Z76.82 AWAITING ORGAN TRANSPLANT STATUS: ICD-10-CM

## 2018-05-11 LAB
DIASTOLIC DYSFUNCTION: NO
ESTIMATED PA SYSTOLIC PRESSURE: 34.36
RETIRED EF AND QEF - SEE NOTES: 65 (ref 55–65)
TRICUSPID VALVE REGURGITATION: NORMAL

## 2018-05-11 PROCEDURE — 93016 CV STRESS TEST SUPVJ ONLY: CPT | Mod: S$PBB,TXP,, | Performed by: INTERNAL MEDICINE

## 2018-05-11 PROCEDURE — 93306 TTE W/DOPPLER COMPLETE: CPT | Mod: PBBFAC,TXP | Performed by: INTERNAL MEDICINE

## 2018-05-11 PROCEDURE — 78452 HT MUSCLE IMAGE SPECT MULT: CPT | Mod: PBBFAC,TXP | Performed by: INTERNAL MEDICINE

## 2018-05-11 PROCEDURE — 99215 OFFICE O/P EST HI 40 MIN: CPT | Mod: S$PBB,GC,TXP, | Performed by: INTERNAL MEDICINE

## 2018-05-11 PROCEDURE — 99213 OFFICE O/P EST LOW 20 MIN: CPT | Mod: PBBFAC,25,TXP | Performed by: INTERNAL MEDICINE

## 2018-05-11 PROCEDURE — 99999 PR PBB SHADOW E&M-EST. PATIENT-LVL III: CPT | Mod: PBBFAC,TXP,, | Performed by: INTERNAL MEDICINE

## 2018-05-11 PROCEDURE — 93018 CV STRESS TEST I&R ONLY: CPT | Mod: S$PBB,TXP,, | Performed by: INTERNAL MEDICINE

## 2018-05-11 RX ORDER — NAPROXEN SODIUM 220 MG/1
81 TABLET, FILM COATED ORAL DAILY
COMMUNITY

## 2018-05-11 NOTE — PROGRESS NOTES
Transplant Recipient Adult Psychosocial Assessment Update    Yessi Valencia  [address]  [place] [address]    Telephone Information:   Mobile [phone]   Home  [phone] (home)   Cell  [phone]  Work  There is no work phone number on file.  E-mail  [email or web address] Messages    Sex: female  YOB: 1950  Age: 67 y.o.    Encounter Date: 2018  [place] Citizen: yes  Primary Language: English   Needed: no    Emergency Contact:  Name: Linda Valencia  Relationship: daughter  Address:  [address]; [place] [address]  Phone Numbers:  [phone] (work), [phone] (mobile)  [phone] (home)    Family/Social Support:   Number of dependents/: Pt reports no dependents.  Marital history: Pt reports never being  and no current significant other. Not currently in a relationship.  Other family dynamics: Pt reports 2 adult children: Linda and Deshawn; 2 sisters, one of whom  last October; 2 brothers: Rolando and Reji. Pt reports having 3 grandchildren.  She states her sister is unable to assist as she cares for grandchildren and a sick . Both brothers are disabled.     Household Composition:    Pt reports she lives alone and drive herself.       Do you and your caregivers have access to reliable transportation? yes     PRIMARY CAREGIVER: Linda Valencia will be primary caregiver, phone number [phone].   provided in-depth information to patient regarding pre- and post-transplant caregiver role.  Patient and dtr verbalizes understanding of the education provided today.   strongly encourages patient and caregiver to have concrete plan regarding post-transplant care giving, including back-up caregiver(s) to ensure care giving needs are met as needed.  Patient verbalizes understanding of caregiver responsibilities.        provided in-depth information to patient and caregiver regarding pre- and  post-transplant caregiver role.   strongly encourages patient and caregiver to have concrete plan regarding post-transplant care giving, including back-up caregiver(s) to ensure care giving needs are met as needed.    Patient and Caregiver states understanding all aspects of caregiver role/commitment and is able/willing/committed to being caregiver to the fullest extent necessary.    Patient and Caregiver verbalizes understanding of the education provided today and caregiver responsibilities.         remains available. Patient and Caregiver agree to contact  in a timely manner if concerns arise.      Able to take time off work without financial concerns: yes     Additional Significant Others who will Assist with Transplant:  Name: Deshawn Mendez   Age: 27  Phone: [phone]  City: [place] State: [place]  Relationship: daughter  Does person drive? yes    Living Will: no Education and information provided to pt.  Healthcare Power of : no Education and information provided to pt.  Advance Directives on file: <<no information> per medical record.  Verbally reviewed LW/HCPA information.   provided patient with copy of LW/HCPA documents and provided education on completion of forms.  Pt stated she would like Linda to be primary HCPOA with Deshawn giron as secondary.    Living Donors: No. Education and resource information given to patient.    Highest Education Level: Attended College/Technical School  Reading Ability: college  Reports difficulty with: seeing. Pt reports issues with eyesight in left eye and is related to pt's diabetes. Pt wears glasses.  Learns Best By:  Pt reports learning best by written and hands-on instruction.     Status: no  VA Benefits: no     Working for Income: Yes  If no, reason not working: Patient Choice - Retired  Patient is currently employed as part time  at [employer].     Spouse/Significant Other Employment: Pt  reports no current significant other.    Disabled: yes: date disability began: 2014, due to: ESRD and diabetes.  She is now on Social Security detention.    Monthly Income:  SSI: $832  Able to afford all costs now and if transplanted, including medications: yes Pt confirms previous reports of pt's daughter  will work on fundraising to assist with the financial aspect of transplant. Pt denies any fundraising done to date. Pt is concerned about about affording medication after transplant. SW educated pt on medication formulary lists and encouraged her to call her insurance company to determine what price her medication may be. Pt's daughters reports she will assist if needed.   Patient and Caregiver verbalizes understanding of personal responsibilities related to transplant costs and the importance of having a financial plan to ensure that patients transplant costs are fully covered.       provided fundraising information/education. Patient and Caregiververbalizes understanding.   remains available.    Insurance:   Payor/Plan Subscr  Sex Relation Sub. Ins. ID Effective Group Num   1. MEDICARE - ME* KANWAL EMERSON 1950 Female  [id number] 9/1/15                                    [address]   2. BLUE CROSS BL* KANWAL EMERSON 1950 Female  [id number]                                   [address] [address]       Primary Insurance (for UNOS reporting): Public Insurance - Medicare FFS (Fee For Service)  Secondary Insurance (for UNOS reporting): Private Insurance  Pt reports pt's BCBS premium is being paid through [facility] (apprxly $150). SW provided education re:  Pt needing to pay for own premium post transplant and fundraising.  Pt verbalizes understanding.  SW remains available.    Patient and Caregiver verbalizes clear understanding that patient may experience difficulty obtaining and/or be denied insurance coverage post-surgery. This includes and is not  limited to disability insurance, life insurance, health insurance, burial insurance, long term care insurance, and other insurances.      Patient and Caregiver also reports understanding that future health concerns related to or unrelated to transplantation may not be covered by patient's insurance.  Resources and information provided and reviewed.     Patient and Caregiver provides verbal permission to release any necessary information to outside resources for patient care and discharge planning.  Resources and information provided are reviewed.      Dialysis Adherence: Patient reports adherence with all aspects of plan or care, including medications, appointments, diet, and dialysis.   Please see separate note for adherence check.    Infusion Service: patient utilizing? no  Home Health: patient utilizing? no  DME: diabetic supplies and BP cuff  Pulmonary/Cardiac Rehab: Pt denies   ADLS:  Pt reports difficulty with driving due to eyesight, with walking due to neuropathy. Pt reports no difficulties with bathing, cooking, housekeeping, eating, shopping, and taking medication.    Adherence:   Pt reports good adherence with medications, dialysis, and health regimen.  Adherence education and counseling provided.     Per History Section:  Past Medical History:   Diagnosis Date    Arthritis     Breast cancer s/p left mastectomy 2007     No chemotherapy or XRT, rx'd with fumera x5 yrs    Chronic kidney disease (CKD), stage V     Diabetes mellitus, type 2 age 47 5/27/2015    Diabetic nephropathy     Encounter for blood transfusion     ESRD on dialysis     Heart murmur     Hypertension     Neuromuscular disorder     Obesity (BMI 30.0-34.9) 8/25/2017    Seizures     Type II diabetes mellitus with neuropathy      History   Substance Use Topics    Smoking status: Former Smoker    Smokeless tobacco: Never Used      Comment: quit 40+ years ago    Alcohol Use: No          Comment: Pt reports last drinking 5 years  ago and reports only drinking socially at that time  History   Drug Use No     History   Sexual Activity    Sexual activity: Not on file       Per Today's Psychosocial:  Tobacco: none, patient denies any use.  Alcohol: Pt reports last drinking 5 years ago and reports only drinking socially at that time. .  Illicit Drugs/Non-prescribed Medications: none, patient denies any use.    Patient states clear understanding of the potential impact of substance use as it relates to transplant candidacy and is aware of possible random substance screening.  Substance abstinence/cessation counseling, education and resources provided and reviewed.     Arrests/DWI/Treatment/Rehab: patient denies    Psychiatric History:    Mental Health: Pt reports no history of or current mental health issues or concerns.   Psychiatrist/Counselor: Pt denies seeing a mental health professional.  Medications:  Pt denies taking medications for mental health reasons.  Suicide/Homicide Issues: Pt denies any history of or current suicidal or homicidal ideations.    Safety at home: Pt reports no safety concerns in household.    Knowledge: Patient and Caregiver states having clear understanding and realistic expectations regarding the potential risks and potential benefits of organ transplantation and organ donation and agrees to discuss with health care team members and support system members, as well as to utilize available resources and express questions and/or concerns in order to further facilitate the pt informed decision-making.  Resources and information provided and reviewed.    Patient and Caregiver is aware of [facility]'s affiliation and/or partnership with agencies in home health care, LTAC, SNF, [facility], and other hospitals and clinics.    Understanding: Patient and Caregiver reports having a clear understanding of the many lifetime commitments involved with being a transplant recipient, including costs, compliance, medications, lab work,  procedures, appointments, concrete and financial planning, preparedness, timely and appropriate communication of concerns, abstinence (ETOH, tobacco, illicit non-prescribed drugs), adherence to all health care team recommendations, support system and caregiver involvement, appropriate and timely resource utilization and follow-through, mental health counseling as needed/recommended, and patient and caregiver responsibilities.  Social Service Handbook, resources and detailed educational information provided and reviewed.  Educational information provided.    Patient reports a clear understanding that risks and benefits may be involved with organ transplantation and with organ donation.       Patient also reports clear understanding that psychosocial risk factors may affect patient, and include but are not limited to feelings of depression, generalized anxiety, anxiety regarding dependence on others, post traumatic stress disorder, feelings of guilt and other emotional and/or mental concerns, and/or exacerbation of existing mental health concerns.  Detailed resources provided and discussed.      Patient agrees to access appropriate resources in a timely manner as needed and/or as recommended, and to communicate concerns appropriately.  Patient also reports a clear understanding of treatment options available.     Patient verbalized understanding of the expectations of the transplant process. SW educated pt on mental health concerns as it pertains to the transplant process. Pt reports being open to seeing psych for talk therapy and medications if necessary and agrees to contact the transplant team if the process becomes too overwhelming. SW provided education and emotional support regarding the transplant process. SW remains available.    Patient received education in a group setting.   reviewed education, provided additional information, and answered questions.    Feelings or Concerns: Pt denies having  "any concerns and/or overwhelming feelings regarding transplant at this time. Pt reports high motivation to pursue organ transplant at this time.    Coping: Pt reports coping well with the transplant process at this time and reports reading, going to social events, walking, taking grandkids to park, working part time, watching tv, listening to gospel music and sewing as ways to cope. Pt reports Religious home as [facility] in [place], [place] with Dr. Celso Vazquez presiding. "I trust in God."    Goals: Pt reports "living a long healthy life" as a goal for after transplant.  SW provided support and education. Pt verbalizes understanding that transplant is not a guarantee of ending dialysis and life after transplantation will be a "new normal" post transplant.  SW remains available.    Interview Behavior: Patient and Caregiver presents as alert and oriented x 4, good eye contact, calm and communicative. She was accompanied by her dtr, Linda who presented as supportive of pt's pursuit of transplant.          Transplant Social Work - Candidacy  Assessment/Plan:     Psychosocial Suitability: Patient presents as an acceptable candidate for kidney transplant at this time. Based on psychosocial risk factors, patient presents as low risk, due to stable caregiver plan, financial plan, and lack of psychosocial concerns at this time.      Recommendations/Additional Comments: SW recommends that pt conduct fundraising to assist pt with pay for cost of medication, food,gas, and other transplant related expenses. SW recommends that pt remain free of all tobacco,ETOH, and substance use. SW remains available to assist with any concerns that may arise as pt navigates through the transplant process.      Alayna Antonio LMSW         "

## 2018-05-11 NOTE — LETTER
May 14, 2018        Oliver Cardona  [address]  [place] [address]  Phone: [phone]  Fax: [phone]             [facility]  [address]  [facility] [address]  Phone: [phone]   Patient: Yessi Valencia   MR Number: [id number]   YOB: 1950   Date of Visit: 5/11/2018       Dear Dr. Oliver Cardona    Thank you for referring Yessi Valencia to me for evaluation. Attached you will find relevant portions of my assessment and plan of care.    If you have questions, please do not hesitate to call me. I look forward to following Yessi Valencia along with you.    Sincerely,    Gaby Dao MD    Enclosure    If you would like to receive this communication electronically, please contact [email or web address] or [phone] to request [employer] Link access.    [employer] Link is a tool which provides read-only access to select patient information with whom you have a relationship. Its easy to use and provides real time access to review your patients record including encounter summaries, notes, results, and demographic information.    If you feel you have received this communication in error or would no longer like to receive these types of communications, please e-mail [email or web address]

## 2018-05-11 NOTE — PROGRESS NOTES
Kidney Transplant Recipient Reevalulation    Referring Physician: Oliver Cardona  Current Nephrologist: Oliver Cardona  Waitlist Status: active  Dialysis Start Date: 6/3/2015    Subjective:     CC:  Six month reassessment of kidney transplant candidacy.    HPI:  Ms. Valencia is a 67 y.o. year old Black or  female with ESRD secondary to diabetic nephropathy.  She has been on the wait list for a kidney transplant at [facility] since 6/3/2015. Patient is currently on hemodialysis started on 6/3/2015. Patient is dialyzing on TTS schedule.  Patient reports that she is tolerating dialysis well.. She has a RUE AV fistula. Patient denies any recent hospitalizations or ED visits. Since last visit 8/2017, she was doing well in general, no hospital admission. Reported missed once session of HD when her sister passed away. Patient reported that her PTH was noted high and she had intolerance with Sensipar, GI Sx ( N and V) she reported that she will be started on IV Sensipar 6/2018. Also reported compliance with HD, reported KT/v was >1.2 consistently. She had swellings and hematoma in the AV fistula and she got fistulagram and stented 2/2018, fistula works with no problem.   Patient reported that she still urinated around 500cc daily.          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   Respiratory: Denies shortness of breath, dyspnea on exertion, orthopnea, wheezing, hemoptysis, denies known TB exposure or history of positive TB skin test  Cardiovascular: Denies chest pain, palpitations, history of MI, history of stroke, history of DVT  Gastrointestinal: Denies abdominal pain, nausea/vomiting/diarrhea/constipation. Denies history of GI bleeding or ulcers. +ve GERD on PPI daily.    Genitourinary: Denies history of kidney stones, recurrent UTI's, history of urinary obstruction, hematuria,  "dysuria, urinary frequency, incontinence  Musculoskeletal: Denies trouble moving extremities, denies joint pain or swelling, +ve for claudication, follow up with vascular 5/25/2018   Skin: 1/2018 history of skin cancer ( frozen section ) by Dr Gardner in [facility] , denies rash, ulcerations  Heme/onc: Denies any history of cancer, denies history of coagulopathies or bleeding disorders  Endocrine: Denies thyroid disease, unintentional weight loss/weight gain  Neurological: Denies tremors, seizures, dizziness, headaches, peripheral neuropathy  Psychiatric: Denies anxiety, depression. Denies hallucinations or delusions.    Medications:  Current Outpatient Prescriptions   Medication Sig Dispense Refill    amlodipine (NORVASC) 10 MG tablet Take 10 mg by mouth.      aspirin 81 MG Chew Take 81 mg by mouth once daily.      atorvastatin (LIPITOR) 80 MG tablet Take 80 mg by mouth.      B complex-vitamin C-folic acid (NEPHRO-ANDI) 0.8 mg Tab Take 1 tablet by mouth once daily.       carvedilol (COREG) 25 MG tablet Take 25 mg by mouth.      cloNIDine 0.3 mg/24 hr td ptwk (CATAPRES) 0.3 mg/24 hr       ergocalciferol (VITAMIN D2) 50,000 unit Cap Take 50,000 Units by mouth every 7 days.       HUMALOG KWIKPEN 100 unit/mL InPn pen Inject 4 Units into the skin 3 (three) times daily before meals.       hydrALAZINE (APRESOLINE) 100 MG tablet Take 100 mg by mouth every 8 (eight) hours.       hydrocodone-acetaminophen 10-325mg (NORCO)  mg Tab Take 1 tablet by mouth daily as needed.       insulin glargine 300 unit/mL (1.5 mL) InPn Inject 30 Units into the skin once daily.       insulin needles, disposable, (BD ULTRA-FINE MADELINE PEN NEEDLES) 32 x 5/32 " Ndle Use with Victoza daily      irbesartan (AVAPRO) 300 MG tablet Take 300 mg by mouth once daily.       lactulose (CHRONULAC) 20 gram/30 mL Soln Take 20 g by mouth daily as needed.       loratadine (CLARITIN) 10 mg tablet Take 10 mg by mouth.      " "omeprazole (PRILOSEC) 40 MG capsule Take 40 mg by mouth daily as needed.       ondansetron (ZOFRAN) 4 MG tablet Take 4 mg by mouth.      pantoprazole (PROTONIX) 40 MG tablet Take 40 mg by mouth daily as needed.       pen needle, diabetic 32 gauge x 5/32" Ndle Use with Victoza daily      SENSIPAR 30 mg Tab       sevelamer carbonate (RENVELA) 800 mg Tab Take 800 mg by mouth.      torsemide (DEMADEX) 100 MG Tab 100 mg once daily.       calcitRIOL (ROCALTROL) 0.5 MCG Cap Take 0.5 mcg by mouth.      cloNIDine (CATAPRES) 0.1 MG tablet Take 0.1 mg by mouth.       Current Facility-Administered Medications   Medication Dose Route Frequency Provider Last Rate Last Dose    heparin (porcine) injection 2,000 Units  2,000 Units Intravenous PRN Amparo Simeon MD               Objective:   body mass index is 34.68 kg/m².   BP (!) 159/80 (BP Location: Left arm, Patient Position: Sitting, BP Method: Medium (Automatic))   Pulse 84   Temp 97.9 °F (36.6 °C) (Oral)   Resp 16   Ht 5' 4.37" (1.635 m)   Wt 92.7 kg (204 lb 5.9 oz)   SpO2 99%   BMI 34.68 kg/m²       Physical Exam   General: No acute distress, well groomed, alert and oriented x 3  HEENT: Normocephalic, atraumatic, PERRLA, sclera anicteric, conjunctiva/corneas clear, EOM's intact bilaterally.    Neck: Supple, symmetrical, trachea midline, no masses, no carotid bruits, no JVD, thyroid is normal without nodules or enlargement   Respiratory: Clear to auscultation bilaterally, respirations unlabored, no rales/rhonchi/wheezing   Cardiovacular: Regular rate and rhythm, S1, S2 normal, no murmurs, no rubs or gallops   Gastrointestinal: Soft, non-tender, bowel sounds normal, no masses, no palpable organomegaly  Extremities: No clubbing or cyanosis of upper extremities bilaterally, +1 pedal edema bilaterally; +1 weak bilateral femoral, posterior tibial, and dorsalis pedis pulses.   Skin: warm and dry; no rash on exposed skin. Punched out skin lesion at the site of derm Bx on " the anterior mid left leg.   Lymph nodes: Cervical and supraclavicular nodes normal   Neurologic: No focal neurologic deficits.   Musculoskeletal: moves all extremities without difficulty, FROM, 5/5 strength, ambulates without an assistive device  Psychiatric: Normal mood and affect. Responds appropriately to questions.      Labs:  Lab Results   Component Value Date    WBC 7.35 05/27/2015    HGB 8.9 (L) 05/27/2015    HCT 28.8 (L) 05/27/2015     05/27/2015    K 4.3 05/27/2015     05/27/2015    CO2 25 05/27/2015    BUN 72 (H) 05/27/2015    CREATININE 4.5 (H) 05/27/2015    EGFRNONAA 9.8 (A) 05/27/2015    CALCIUM 9.0 05/27/2015    PHOS 4.5 05/27/2015    ALBUMIN 2.9 (L) 05/27/2015    AST 19 05/27/2015    ALT 26 05/27/2015    .0 (H) 07/13/2015       No results found for: PREALBUMIN, BILIRUBINUA, GGT, AMYLASE, LIPASE, PROTEINUA, NITRITE, RBCUA, WBCUA    No results found for: HLAABCTYPE    Lab Results   Component Value Date    CPRA 0 03/13/2018    FC0VFXD A34 09/19/2017    CIABCLM A*34:01 03/13/2018    CIIAB Negative 03/13/2018       Labs were reviewed with the patient.    Pre-transplant Workup:   Reviewed with the patient.    Assessment:     1. ESRD on dialysis    2. Diabetic nephropathy associated with type 2 diabetes mellitus    3. Type 2 diabetes mellitus with diabetic nephropathy, unspecified whether long term insulin use    4. Bladder outlet obstruction, Chronic (as per CT 10/2015)     5. Essential hypertension    6. Obesity (BMI 30.0-34.9)    7. Organ transplant candidate    8. Latent tuberculosis by blood test    9. Awaiting organ transplant status        Plan:     Transplant Candidacy:   Ms. Valencia is an unacceptable kidney transplant candidate.  She will be placed in an inactive status at present due to elevated PTH >1000 .    Case discussed with Dr. Dao.    ISAURA Perry   IM Resident PGY3    Staff Transplant Nephrology Addendum:     Patient was discussed with Dr. Stephanie Almanza as  outlined in his note. I have personally evaluated Ms. Valencia and agree with the findings listed in Dr. Almanza's attached note with additional comments/corrections as below:     68 yo AAF with history of ESRD secondary to DM who has been on dialysis since 6/3/15 here for renal transplant recipient re-eval. She was last seen in listed [place] eval clinic on 8/25/17. States her residual UOP ~500 cc/day. States she has claudication symptoms and has been seen by vascular surgery --> will need to obtain records. She reports she has to stop after walking 1 block. Patient was able to walk with this writer from clinic to the 2nd floor using the stairwell closest to the renal transplant clinic and back to clinic exam room without any chest pain or significant RASMUSSEN. She did report her legs were tired but her pace was improved after she got going. Her pulse ox initially after returning to clinic vitals room was 77% but improved to 98% and she did not appear to be in respiratory distress. Reviewed her dialysis labs and logs. Overall BP acceptable. KT/Vs are all >1.2. PTH trend: 1394 (11/917) --> 1368 (12/7/17) --> 1173 (1/18/18) --> 866 (1/23/18) --> 1407 (2/8/18) --> 1353 (3/8/18) --> 1247 (4/5/18). Patient reports her last one was in the 1300s or 1400s. She states that she will occasionally have nausea/vomiting with sensipar 30 mg daily so she pretty much takes it every other day. States she is supposed to start IV parsabiv in June 2018 (IV calcimimetic). Given patient reported claudication symptoms and significantly elevated PTH will need to place patient in inactive status. This was communicated to listed coordinator Aayush Adame at 3:55 pm on 5/11/18.     Would ask transplant surgery whether she needs repeat iliac dopplers or CT A/P - last doppler was in Sept 2015 and last CT A/P was in Oct 2015.     Encouraged her regarding compliance with dialysis and other physician orders/recommendations. Advised her to stay active  and exercise.     We discussed A2 --> B recipient donation and she signed consent for A2 organ transplantation. Will obtain A2 titer.     Encouraged her to have any potential living donors contact the living donor coordinator.      Gaby Dao MD  Renal Transplant Attending       Follow-up:   In addition to the tests noted in the plan, Ms. Valencia will continue to have reevaluation as per the standing pre-kidney transplant protocol:  1. Monthly blood for PRA  2. Annual return to clinic, except HIV positive, > 65 years of age, or pancreas transplant candidates who will be scheduled to see transplant every 6 months while in pre-transplant phase  3. Annual re-testing: CXR, EKG, yearly mammograms for women over 40 and PSA for males over 40, cardiology follow-up as recommended by initial cardiology pre-transplant evaluation  4. Renal ultrasound every 2 years  5. Baseline colonoscopy after age 50 and repeated as recommended    UNOS Patient Status  Functional Status: 60% - Requires occasional assistance but is able to care for needs  Physical Capacity: No Limitations

## 2018-05-11 NOTE — PATIENT INSTRUCTIONS
1. Stay active   2. Have any potential living donors call the living donor coordinator   3. We will get records from the vascular doctor   4. Your PTH needs to be <1000   5. We will put you on hold on the wait list until your PTH is <1000  6. Get your blood drawn for the A2 level

## 2018-05-11 NOTE — LETTER
Date: 5/11/2018          Listed Patient      To: Dialysis Unit  and Charge RN From: [facility] and      Kidney Transplant Nurse Coordinators    RE: Yessi GARCIA Valencia, 1950, [id number]     At [facility], we conduct adherence checks as an important part of transplant care. Initial and listed patient assessments are not complete without adherence information.        Please complete the following information:     Current Dry Weight: ___________         Most Recent Pre-Treatment Weight: ___________ /date: _________                    Data from the last 3 months:  (data from last 3 months preferred):    Number of AMAs with dates, time, and reasons: ____________________________________________________    ______________________________________________________________________________________________    ______________________________________________________________________________________________    Number of No-Shows with dates and reasons: ______________________________________________________      ______________________________________________________________________________________________    Last intact PTH:  ___________/date: __________      Any concerns with Labs:  YES / NO      If yes, please explain:  ___________________________________________________________________________    ______________________________________________________________________________________________    Any concerns with Caregivers:  YES / NO    If yes, please explain:  ___________________________________________________________________________    ______________________________________________________________________________________________     Any concerns with Transportation:  YES / NO    If yes, please explain:   ___________________________________________________________________________    ______________________________________________________________________________________________    Any Psychiatric and/or Psychosocial concerns:  YES / NO     If yes, please explain: ___________________________________________________________________________    ______________________________________________________________________________________________      PLEASE RETURN TO: FAX: [phone]     Thank you for collaborating with us in the care of this patient.           [address]  ?  [place] [address]  ?  phone [phone]  ?  fax [phone]  ?  [email or web address]  Confidentiality notice: The accompanying facsimile is intended solely for the use of the recipient designated above. Document(s) transmitted herewith may contain information that is confidential and privileged. Delivery, distribution or dissemination of this communication other than to the intended recipient is strictly prohibited. If you have received this facsimile in error, please notify us immediately by telephone.

## 2018-05-14 NOTE — PROGRESS NOTES
Staff Transplant Nephrology Addendum:     Patient was discussed with Dr. Stephanie Almanza as outlined in his note. I have personally evaluated Ms. Valencia and agree with the findings listed in Dr. Almanza's attached note with additional comments/corrections as below:     66 yo AAF with history of ESRD secondary to DM who has been on dialysis since 6/3/15 here for renal transplant recipient re-eval. She was last seen in listed [place] eval clinic on 8/25/17. States her residual UOP ~500 cc/day. States she has claudication symptoms and has been seen by vascular surgery --> will need to obtain records. She reports she has to stop after walking 1 block. Patient was able to walk with this writer from clinic to the 2nd floor using the stairwell closest to the renal transplant clinic and back to clinic exam room without any chest pain or significant RASMUSSEN. She did report her legs were tired but her pace was improved after she got going. Her pulse ox initially after returning to clinic vitals room was 77% but improved to 98% and she did not appear to be in respiratory distress. Reviewed her dialysis labs and logs. Overall BP acceptable. KT/Vs are all >1.2. PTH trend: 1394 (11/917) --> 1368 (12/7/17) --> 1173 (1/18/18) --> 866 (1/23/18) --> 1407 (2/8/18) --> 1353 (3/8/18) --> 1247 (4/5/18). Patient reports her last one was in the 1300s or 1400s. She states that she will occasionally have nausea/vomiting with sensipar 30 mg daily so she pretty much takes it every other day. States she is supposed to start IV parsabiv in June 2018 (IV calcimimetic). Given patient reported claudication symptoms and significantly elevated PTH will need to place patient in inactive status. This was communicated to listed coordinator Aayush Adame at 3:55 pm on 5/11/18.     Would ask transplant surgery whether she needs repeat iliac dopplers or CT A/P - last doppler was in Sept 2015 and last CT A/P was in Oct 2015.     Encouraged her regarding  compliance with dialysis and other physician orders/recommendations. Advised her to stay active and exercise.     We discussed A2 --> B recipient donation and she signed consent for A2 organ transplantation. Will obtain A2 titer.     Encouraged her to have any potential living donors contact the living donor coordinator.      Gaby Dao MD  Renal Transplant Attending

## 2018-06-14 ENCOUNTER — LAB VISIT (OUTPATIENT)
Dept: LAB | Facility: HOSPITAL | Age: 68
End: 2018-06-14
Payer: MEDICARE

## 2018-06-14 DIAGNOSIS — Z76.82 ORGAN TRANSPLANT CANDIDATE: ICD-10-CM

## 2018-06-14 PROCEDURE — 86832 HLA CLASS I HIGH DEFIN QUAL: CPT | Mod: PO,TXP

## 2018-06-14 PROCEDURE — 86833 HLA CLASS II HIGH DEFIN QUAL: CPT | Mod: PO,TXP

## 2018-06-27 LAB — HPRA INTERPRETATION: NORMAL

## 2018-07-26 DIAGNOSIS — R93.89 ABNORMAL FINDINGS ON DIAGNOSTIC IMAGING OF OTHER SPECIFIED BODY STRUCTURES: ICD-10-CM

## 2018-07-26 DIAGNOSIS — Z76.82 ORGAN TRANSPLANT CANDIDATE: Primary | ICD-10-CM

## 2018-07-26 DIAGNOSIS — Z76.82 AWAITING ORGAN TRANSPLANT STATUS: Primary | ICD-10-CM

## 2018-08-16 ENCOUNTER — TELEPHONE (OUTPATIENT)
Dept: RADIOLOGY | Facility: HOSPITAL | Age: 68
End: 2018-08-16

## 2018-08-17 ENCOUNTER — HOSPITAL ENCOUNTER (OUTPATIENT)
Dept: RADIOLOGY | Facility: HOSPITAL | Age: 68
Discharge: HOME | End: 2018-08-17
Attending: TRANSPLANT SURGERY
Payer: MEDICARE

## 2018-08-17 DIAGNOSIS — R93.89 ABNORMAL FINDINGS ON DIAGNOSTIC IMAGING OF OTHER SPECIFIED BODY STRUCTURES: ICD-10-CM

## 2018-08-17 DIAGNOSIS — Z76.82 ORGAN TRANSPLANT CANDIDATE: ICD-10-CM

## 2018-08-17 PROCEDURE — 74176 CT ABD & PELVIS W/O CONTRAST: CPT | Mod: 26,TXP,, | Performed by: RADIOLOGY

## 2018-08-17 PROCEDURE — 74176 CT ABD & PELVIS W/O CONTRAST: CPT | Mod: TC,PO,TXP

## 2018-08-17 PROCEDURE — 93978 VASCULAR STUDY: CPT | Mod: 26,TXP,, | Performed by: RADIOLOGY

## 2018-08-17 PROCEDURE — 93978 VASCULAR STUDY: CPT | Mod: TC,PO,TXP

## 2018-08-23 NOTE — PROGRESS NOTES
Extensive calcifications.     Need to present at selection, but transplant appears feasible.    Need repeat CT in 2 years if not transplanted.

## 2018-10-10 DIAGNOSIS — Z76.82 ORGAN TRANSPLANT CANDIDATE: ICD-10-CM

## 2018-10-31 DIAGNOSIS — Z76.82 ORGAN TRANSPLANT CANDIDATE: ICD-10-CM

## 2018-10-31 DIAGNOSIS — Z76.82 AWAITING ORGAN TRANSPLANT STATUS: Primary | ICD-10-CM

## 2018-12-14 ENCOUNTER — SOCIAL WORK (OUTPATIENT)
Dept: TRANSPLANT | Facility: CLINIC | Age: 68
End: 2018-12-14

## 2018-12-14 ENCOUNTER — TELEPHONE (OUTPATIENT)
Dept: TRANSPLANT | Facility: CLINIC | Age: 68
End: 2018-12-14

## 2018-12-14 NOTE — TELEPHONE ENCOUNTER
Dialysis Update    Yulissa nurse at pt's dialysis unit reports pt had 2-3 no shows. Nurse reports 1 no-show was due to diarrhea and nurse couldn't recall why the pt missed the other treatments.     [facility] remains available at [phone].

## 2018-12-14 NOTE — PROGRESS NOTES
Dialysis Adherence:    Yulissa nurse at pt's dialysis unit reports over the last three months that patient has had 2 AMAs, 3 no shows and no issues with labs, transportation or caregiver support. Nurse reports the pt had to sign off early for doctor's appointments both times. Nurse had no concerns or questions.    [facility] remains available at [phone].

## 2019-02-19 DIAGNOSIS — Z76.82 ORGAN TRANSPLANT CANDIDATE: Primary | ICD-10-CM

## 2019-03-22 ENCOUNTER — OFFICE VISIT (OUTPATIENT)
Dept: TRANSPLANT | Facility: CLINIC | Age: 69
End: 2019-03-22
Payer: MEDICARE

## 2019-03-22 ENCOUNTER — HOSPITAL ENCOUNTER (OUTPATIENT)
Dept: RADIOLOGY | Facility: HOSPITAL | Age: 69
Discharge: HOME | End: 2019-03-22
Attending: NURSE PRACTITIONER
Payer: MEDICARE

## 2019-03-22 ENCOUNTER — HOSPITAL ENCOUNTER (OUTPATIENT)
Dept: CARDIOLOGY | Facility: CLINIC | Age: 69
Discharge: HOME | End: 2019-03-22
Attending: NURSE PRACTITIONER
Payer: MEDICARE

## 2019-03-22 VITALS
OXYGEN SATURATION: 99 % | SYSTOLIC BLOOD PRESSURE: 131 MMHG | RESPIRATION RATE: 17 BRPM | DIASTOLIC BLOOD PRESSURE: 67 MMHG | BODY MASS INDEX: 32.12 KG/M2 | TEMPERATURE: 98 F | HEIGHT: 66 IN | HEART RATE: 81 BPM

## 2019-03-22 VITALS — HEIGHT: 66 IN | HEART RATE: 80 BPM | BODY MASS INDEX: 31.98 KG/M2 | WEIGHT: 199 LBS

## 2019-03-22 DIAGNOSIS — Z76.82 AWAITING ORGAN TRANSPLANT STATUS: ICD-10-CM

## 2019-03-22 DIAGNOSIS — E11.21 DIABETIC NEPHROPATHY ASSOCIATED WITH TYPE 2 DIABETES MELLITUS: Chronic | ICD-10-CM

## 2019-03-22 DIAGNOSIS — Z76.82 ORGAN TRANSPLANT CANDIDATE: ICD-10-CM

## 2019-03-22 DIAGNOSIS — C50.912 MALIGNANT NEOPLASM OF LEFT FEMALE BREAST, UNSPECIFIED ESTROGEN RECEPTOR STATUS, UNSPECIFIED SITE OF BREAST: Chronic | ICD-10-CM

## 2019-03-22 DIAGNOSIS — E66.9 OBESITY (BMI 30.0-34.9): ICD-10-CM

## 2019-03-22 DIAGNOSIS — N18.6 ESRD ON DIALYSIS: Primary | Chronic | ICD-10-CM

## 2019-03-22 DIAGNOSIS — Z99.2 ESRD ON DIALYSIS: Primary | Chronic | ICD-10-CM

## 2019-03-22 LAB
ASCENDING AORTA: 3.17 CM
AV INDEX (PROSTH): 1
AV MEAN GRADIENT: 3.98 MMHG
AV PEAK GRADIENT: 8.07 MMHG
AV VALVE AREA: 2.68 CM2
AV VELOCITY RATIO: 1
BSA FOR ECHO PROCEDURE: 2.05 M2
CV ECHO LV RWT: 0.46 CM
DOP CALC AO PEAK VEL: 1.42 M/S
DOP CALC AO VTI: 28.22 CM
DOP CALC LVOT AREA: 2.69 CM2
DOP CALC LVOT DIAMETER: 1.85 CM
DOP CALC LVOT PEAK VEL: 1.42 M/S
DOP CALC LVOT STROKE VOLUME: 75.63 CM3
DOP CALCLVOT PEAK VEL VTI: 28.15 CM
E WAVE DECELERATION TIME: 138.92 MSEC
E/A RATIO: 0.66
E/E' RATIO: 10
ECHO LV POSTERIOR WALL: 0.95 CM (ref 0.6–1.1)
FRACTIONAL SHORTENING: 30 % (ref 28–44)
INTERVENTRICULAR SEPTUM: 0.85 CM (ref 0.6–1.1)
IVRT: 0.05 MSEC
LA MAJOR: 5.51 CM
LA MINOR: 5.99 CM
LA WIDTH: 3.53 CM
LEFT ATRIUM SIZE: 4.04 CM
LEFT ATRIUM VOLUME INDEX: 34.9 ML/M2
LEFT ATRIUM VOLUME: 69.58 CM3
LEFT INTERNAL DIMENSION IN SYSTOLE: 2.88 CM (ref 2.1–4)
LEFT VENTRICLE DIASTOLIC VOLUME INDEX: 37.77 ML/M2
LEFT VENTRICLE DIASTOLIC VOLUME: 75.39 ML
LEFT VENTRICLE MASS INDEX: 57.9 G/M2
LEFT VENTRICLE SYSTOLIC VOLUME INDEX: 15.9 ML/M2
LEFT VENTRICLE SYSTOLIC VOLUME: 31.67 ML
LEFT VENTRICULAR INTERNAL DIMENSION IN DIASTOLE: 4.13 CM (ref 3.5–6)
LEFT VENTRICULAR MASS: 115.48 G
LV LATERAL E/E' RATIO: 9.38
LV SEPTAL E/E' RATIO: 10.71
MV PEAK A VEL: 1.14 M/S
MV PEAK E VEL: 0.75 M/S
PISA TR MAX VEL: 2.49 M/S
PULM VEIN S/D RATIO: 1.21
PV PEAK D VEL: 0.57 M/S
PV PEAK S VEL: 0.69 M/S
RA MAJOR: 4.73 CM
RA PRESSURE: 3 MMHG
RA WIDTH: 4.38 CM
RIGHT VENTRICULAR END-DIASTOLIC DIMENSION: 3.39 CM
RV TISSUE DOPPLER FREE WALL SYSTOLIC VELOCITY 1 (APICAL 4 CHAMBER VIEW): 12.19 M/S
SINUS: 2.98 CM
STJ: 2.33 CM
TDI LATERAL: 0.08
TDI SEPTAL: 0.07
TDI: 0.08
TR MAX PG: 24.8 MMHG
TRICUSPID ANNULAR PLANE SYSTOLIC EXCURSION: 2.17 CM
TV REST PULMONARY ARTERY PRESSURE: 28 MMHG

## 2019-03-22 PROCEDURE — 99215 OFFICE O/P EST HI 40 MIN: CPT | Mod: S$PBB,TXP,, | Performed by: INTERNAL MEDICINE

## 2019-03-22 PROCEDURE — 93306 TTE W/DOPPLER COMPLETE: CPT | Mod: PBBFAC,TXP | Performed by: INTERNAL MEDICINE

## 2019-03-22 PROCEDURE — 99215 PR OFFICE/OUTPT VISIT, EST, LEVL V, 40-54 MIN: ICD-10-PCS | Mod: S$PBB,TXP,, | Performed by: INTERNAL MEDICINE

## 2019-03-22 PROCEDURE — 71046 X-RAY EXAM CHEST 2 VIEWS: CPT | Mod: 26,TXP,, | Performed by: RADIOLOGY

## 2019-03-22 PROCEDURE — 93306 TRANSTHORACIC ECHO (TTE) COMPLETE (CUPID ONLY): ICD-10-PCS | Mod: 26,S$PBB,TXP, | Performed by: INTERNAL MEDICINE

## 2019-03-22 PROCEDURE — 99999 PR PBB SHADOW E&M-EST. PATIENT-LVL IV: ICD-10-PCS | Mod: PBBFAC,TXP,, | Performed by: INTERNAL MEDICINE

## 2019-03-22 PROCEDURE — 99999 PR PBB SHADOW E&M-EST. PATIENT-LVL IV: CPT | Mod: PBBFAC,TXP,, | Performed by: INTERNAL MEDICINE

## 2019-03-22 PROCEDURE — 71046 X-RAY EXAM CHEST 2 VIEWS: CPT | Mod: TC,TXP

## 2019-03-22 PROCEDURE — 99214 OFFICE O/P EST MOD 30 MIN: CPT | Mod: PBBFAC,25,TXP | Performed by: INTERNAL MEDICINE

## 2019-03-22 PROCEDURE — 71046 XR CHEST PA AND LATERAL: ICD-10-PCS | Mod: 26,TXP,, | Performed by: RADIOLOGY

## 2019-03-22 NOTE — LETTER
Date: 3/22/2019          Listed Patient      To: Dialysis Unit  and Charge RN From: Trini ParraKRISTINA     RE: Yessi Valencia, 1950, [id number]     At [facility], we conduct adherence checks as an important part of transplant care. Initial and listed patient assessments are not complete without adherence information.        Please complete the following information:     Current Dry Weight: ___________         Most Recent Pre-Treatment Weight: ___________ /date: _________                    Data from the last 3 months:  (data from last 3 months preferred):    Number of AMAs with dates, time, and reasons: ____________________________________________________    ______________________________________________________________________________________________    ______________________________________________________________________________________________    Number of No-Shows with dates and reasons: ______________________________________________________      ______________________________________________________________________________________________    Last intact PTH:  ___________/date: __________      Any concerns with Labs:  YES / NO      If yes, please explain:  ___________________________________________________________________________    ______________________________________________________________________________________________    Any concerns with Caregivers:  YES / NO    If yes, please explain:  ___________________________________________________________________________    ______________________________________________________________________________________________     Any concerns with Transportation:  YES / NO    If yes, please explain:  ___________________________________________________________________________    ______________________________________________________________________________________________    Any Psychiatric and/or Psychosocial concerns:  YES /  NO     If yes, please explain: ___________________________________________________________________________    ______________________________________________________________________________________________      PLEASE RETURN TO: FAX: [phone]     Thank you for collaborating with us in the care of this patient.           [address]  ?  [place] [address]  ?  phone [phone]  ?  fax [phone]  ?  [email or web address]  Confidentiality notice: The accompanying facsimile is intended solely for the use of the recipient designated above. Document(s) transmitted herewith may contain information that is confidential and privileged. Delivery, distribution or dissemination of this communication other than to the intended recipient is strictly prohibited. If you have received this facsimile in error, please notify us immediately by telephone.

## 2019-03-22 NOTE — PROGRESS NOTES
Transplant Recipient Adult Psychosocial Assessment Update    Yessi Valencia  [address] [place] [address]  Telephone Information:   Mobile [phone]   Home  [phone] (home)   Cell  [phone]  Work  There is no work phone number on file.  E-mail  [email or web address]    Sex: female  YOB: 1950  Age: 68 y.o.    Encounter Date: 3/22/2019  [place] Citizen: yes  Primary Language: English   Needed: no    Emergency Contact:  Name: Linda Valencia  Relationship: daughter  Address:  [address]; [place] [address]  Phone Numbers:  [phone] (work), [phone] (mobile)  [phone] (home)    Family/Social Support:   Number of dependents/: Pt reports no dependents.  Marital history: Pt reports never being  and no current significant other. Not currently in a relationship.  Other family dynamics: Pt reports 2 adult children: Linda and Deshawn; 2 sisters, one of whom  last October; 2 brothers: Rolando and Reji. Pt reports having 3 grandchildren.  She states her sister is unable to assist as she cares for grandchildren and a sick . Both brothers are disabled.     Household Composition:    Pt resides with adult daughter Linda Valencia.       Do you and your caregivers have access to reliable transportation? yes     PRIMARY CAREGIVER: Linda Valencia will be primary caregiver, phone number [phone].   provided in-depth information to patient regarding pre- and post-transplant caregiver role.  Patient and dtr verbalizes understanding of the education provided today.   strongly encourages patient and caregiver to have concrete plan regarding post-transplant care giving, including back-up caregiver(s) to ensure care giving needs are met as needed.  Patient verbalizes understanding of caregiver responsibilities.        provided in-depth information to patient and caregiver regarding pre- and  "post-transplant caregiver role.   strongly encourages patient and caregiver to have concrete plan regarding post-transplant care giving, including back-up caregiver(s) to ensure care giving needs are met as needed.    Patient and Caregiver states understanding all aspects of caregiver role/commitment and is able/willing/committed to being caregiver to the fullest extent necessary.    Patient and Caregiver verbalizes understanding of the education provided today and caregiver responsibilities.         remains available. Patient and Caregiver agree to contact  in a timely manner if concerns arise.      Able to take time off work without financial concerns: yes     Additional Significant Others who will Assist with Transplant:  Name: Deshawn Mendez (pt reports "she can help sometimes")  Age: 28  Phone: [phone]  City: [place] State: [place]  Relationship: daughter  Does person drive? yes    Living Will: no Education and information provided to pt.  Healthcare Power of : no Education and information provided to pt. Pt reports trusting daughter Linda with medical decisions   Advance Directives on file: <<no information> per medical record.  Verbally reviewed LW/HCPA information.   provided patient with copy of LW/HCPA documents and provided education on completion of forms.  Pt stated she would like Linda to be primary HCPOA with Sarah girondoreendarren as secondary.    Living Donors: No. Education and resource information given to patient.    Highest Education Level: Attended College/Technical School  Reading Ability: college  Reports difficulty with: seeing. Pt reports issues with eyesight in left eye and is related to pt's diabetes. Pt wears glasses.  Learns Best By:  Pt reports learning best by written and hands-on instruction.     Status: no  VA Benefits: no     Working for Income: No  If no, reason not working: Patient Choice - Retired  Patient is " currently retired from [employer] as a  .      Spouse/Significant Other Employment: Pt reports no current significant other.    Disabled: yes: date disability began: 2014, due to: ESRD and diabetes.  She is now on Social Security detention.    Monthly Income:  SSI: $832  Able to afford all costs now and if transplanted, including medications: yes Pt confirms previous reports of pt's daughter  will work on fundraising to assist with the financial aspect of transplant. Pt denies any fundraising done to date. Pt is concerned about about affording medication after transplant. SW educated pt on medication formulary lists and encouraged her to call her insurance company to determine what price her medication may be. Pt's daughters reports she will assist if needed.   Patient and Caregiver verbalizes understanding of personal responsibilities related to transplant costs and the importance of having a financial plan to ensure that patients transplant costs are fully covered.       provided fundraising information/education. Patient and Caregiver verbalizes understanding.   remains available.    Insurance:   Payor/Plan Subscr  Sex Relation Sub. Ins. ID Effective Group Num   1. MEDICARE - ME* KANWAL EMERSON 1950 Female  [id number] 9/1/15                                    [address]   2. BLUE CROSS BL* KANWAL EMERSON 1950 Female  [id number]                                   [address] [address]       Primary Insurance (for UNOS reporting): Public Insurance - Medicare FFS (Fee For Service)  Secondary Insurance (for UNOS reporting): Private Insurance  Pt reports pt's BCBS premium is being paid through [facility] (apprxly $150). KRISTINA provided education re:  Pt needing to pay for own premium post transplant and fundraising.  Pt verbalizes understanding.  SW remains available.    Patient and Caregiver verbalizes clear understanding that patient may experience  difficulty obtaining and/or be denied insurance coverage post-surgery. This includes and is not limited to disability insurance, life insurance, health insurance, burial insurance, long term care insurance, and other insurances.      Patient and Caregiver also reports understanding that future health concerns related to or unrelated to transplantation may not be covered by patient's insurance.  Resources and information provided and reviewed.     Patient and Caregiver provides verbal permission to release any necessary information to outside resources for patient care and discharge planning.  Resources and information provided are reviewed.       Dialysis Adherence: Patient reports adherence with all aspects of plan or care, including medications, appointments, diet, and dialysis. SW faxed adherence form.     Infusion Service: patient utilizing? no  Home Health: patient utilizing? yes Pt reports breaking her ankle in 4 places before the 2018 [place] holiday. Pt reports 2 1/2 month long hospital admit. Pt reports after discharged, she started receiving PT/OT and RN services in home. Pt is receiving care from [facility].    DME: diabetic supplies,BP cuff,walker and wheelchaiar   Pulmonary/Cardiac Rehab: Pt denies   ADLS:  Pt reports difficulty with driving due to eyesight, with walking due to neuropathy. Pt reports no difficulties with bathing, cooking, housekeeping, eating, shopping, and taking medication.    Adherence:   Pt reports good adherence with medications, dialysis, and health regimen.  Adherence education and counseling provided.     Per History Section:  Past Medical History:   Diagnosis Date    Arthritis     Breast cancer s/p left mastectomy 2007     No chemotherapy or XRT, rx'd with fumera x5 yrs    Chronic kidney disease (CKD), stage V     Diabetes mellitus, type 2 age 47 5/27/2015    Diabetic nephropathy     Encounter for blood transfusion     ESRD on dialysis     Heart murmur      Hypertension     Neuromuscular disorder     Obesity (BMI 30.0-34.9) 8/25/2017    Seizures     Type II diabetes mellitus with neuropathy      History   Substance Use Topics    Smoking status: Former Smoker    Smokeless tobacco: Never Used      Comment: quit 40+ years ago    Alcohol Use: No          Comment: Pt reports last drinking 5 years ago and reports only drinking socially at that time  Social History     Substance and Sexual Activity   Drug Use No     Social History     Substance and Sexual Activity   Sexual Activity Not on file       Per Today's Psychosocial:  Tobacco: none, patient denies any use.  Alcohol: Pt reports last drinking 5 years ago and reports only drinking socially at that time. .  Illicit Drugs/Non-prescribed Medications: none, patient denies any use.    Patient states clear understanding of the potential impact of substance use as it relates to transplant candidacy and is aware of possible random substance screening.  Substance abstinence/cessation counseling, education and resources provided and reviewed.     Arrests/DWI/Treatment/Rehab: patient denies    Psychiatric History:    Mental Health: Pt reports no history of or current mental health issues or concerns.   Psychiatrist/Counselor: Pt denies seeing a mental health professional.  Medications:  Pt denies taking medications for mental health reasons.  Suicide/Homicide Issues: Pt denies any history of or current suicidal or homicidal ideations.    Safety at home: Pt reports no safety concerns in household.    Knowledge: Patient and Caregiver states having clear understanding and realistic expectations regarding the potential risks and potential benefits of organ transplantation and organ donation and agrees to discuss with health care team members and support system members, as well as to utilize available resources and express questions and/or concerns in order to further facilitate the pt informed decision-making.  Resources and  information provided and reviewed.    Patient and Caregiver is aware of [facility]'s affiliation and/or partnership with agencies in home health care, LTAC, SNF, [facility] [place] [place], and other hospitals and clinics.    Understanding: Patient and Caregiver reports having a clear understanding of the many lifetime commitments involved with being a transplant recipient, including costs, compliance, medications, lab work, procedures, appointments, concrete and financial planning, preparedness, timely and appropriate communication of concerns, abstinence (ETOH, tobacco, illicit non-prescribed drugs), adherence to all health care team recommendations, support system and caregiver involvement, appropriate and timely resource utilization and follow-through, mental health counseling as needed/recommended, and patient and caregiver responsibilities.  Social Service Handbook, resources and detailed educational information provided and reviewed.  Educational information provided.    Patient and Caregiver reports a clear understanding that risks and benefits may be involved with organ transplantation and with organ donation.       Patient and Caregiver also reports clear understanding that psychosocial risk factors may affect patient, and include but are not limited to feelings of depression, generalized anxiety, anxiety regarding dependence on others, post traumatic stress disorder, feelings of guilt and other emotional and/or mental concerns, and/or exacerbation of existing mental health concerns.  Detailed resources provided and discussed.      Patient and Caregiver agrees to access appropriate resources in a timely manner as needed and/or as recommended, and to communicate concerns appropriately.  Patient and Caregiver also reports a clear understanding of treatment options available.     Patient and Caregiver verbalized understanding of the expectations of the transplant process. SW educated pt on mental health concerns as it pertains to the  "transplant process. Pt reports being open to seeing psych for talk therapy and medications if necessary and agrees to contact the transplant team if the process becomes too overwhelming. SW provided education and emotional support regarding the transplant process. SW remains available.    Patient and Caregiver received education in a group setting.   reviewed education, provided additional information, and answered questions.    Feelings or Concerns: Pt denies having any concerns and/or overwhelming feelings regarding transplant at this time. Pt reports high motivation to pursue organ transplant at this time.    Coping: Pt reports coping well with the transplant process at this time and reports reading, going to social events, walking, taking grandkids to park, working part time, watching tv, listening to [employer] music and sewing as ways to cope. Pt reports [facility] home as [facility] in [place], [place] with Dr. Celso Vazquez presiding. "I trust in God."    Goals: Pt reports "living a long healthy life" as a goal for after transplant.  SW provided support and education.  SW remains available.    Interview Behavior: Patient and Caregiver presents as alert and oriented x 4, pleasant, good eye contact, well groomed, recall good, concentration/judgement good, average intelligence, calm, communicative, cooperative and asking and answering questions appropriately. She was accompanied by her dtr and minor granddaughter, Linda who presented as supportive of pt's pursuit of transplant.          Transplant Social Work - Candidacy  Assessment/Plan:     Psychosocial Suitability: Patient presents as an acceptable candidate for kidney transplant at this time. Based on psychosocial risk factors, patient presents as low risk, due to stable caregiver plan, financial plan, and lack of psychosocial concerns at this time.      Recommendations/Additional Comments: SW recommends that pt conduct fundraising to " assist pt with pay for cost of medication, food,gas, and other transplant related expenses. SW recommends that pt remain free of all tobacco,ETOH, and substance use. SW remains available to assist with any concerns that may arise as pt navigates through the transplant process.      Puja Parra, MSW, LMSW

## 2019-03-22 NOTE — LETTER
March 22, 2019    Yessi Valencia  [address]  [place] [address]      Dear Yessi Valencia:  MRN: [id number]    The [facility] team reviewed your transplant candidacy.  It is our programs opinion that you are temporarily not a transplant candidate at our facility as of 3/22/2019 because of non healing diabetic wounds.  You will remain listed on the wait list, but will not be able to receive a transplant until this issue is resolved.      Attached is a letter from the United Network for Organ Sharing (UNOS).  It describes the services and information offered to patients by UNOS and the Organ Procurement and Transplant Network.    The [facility] team remains available to answer any questions.  Should you have any questions regarding this decision, please do not hesitate to contact our pre-transplant office.      Sincerely,      Shital Earl MD  Medical Director, Kidney & Kidney/Pancreas Transplantation    CSC/lc    Cc: [facility]          OPTN/UNOS: Your Resource for Organ Transplant Information        If you have a question regarding your own medical care, you always should call your transplant center first. However, for general organ transplant-related information, you can call the United Network for Organ Sharing (UNOS) toll-free patient services line at 1-113.760.2453.    Anyone, including potential transplant candidates, recipients, family members/friends, living donors, and/or donor family members can call this number to:    · talk about organ donation, living donation, how transplant and donation work, the donation process, transplant policies, and transplant/donor information;  · get a free patient information kit with helpful booklets, waiting list and transplant information, and a list of all transplant centers;  · ask questions about the Organ Procurement and Transplantation Network (OPTN) web site  (www.optn.transplant.hrsa.gov); the UNOS Web site (www.unos.org); or the UNOS web site for living donors and transplant recipients (www.transplantliving.org);  · learn how UNOS and the OPTN can help you;  · talk about any concerns that you may have with a transplant center and how they perform    UNOS is a not-for-profit organization that provides all of the administrative services for the national OPTN under federal contract to the Health Resources and Services Administration (HRSA), an agency under the U.S. Department of Health and Human Services (HHS).     UNOS and OPTN responsibilities include:    · writing educational material for patients, the public and professionals;  · helping to make people aware of the need for donated organs and tissue;  · writing organ transplant policy with help from doctors, nurses, transplant patients/candidates, donor families, living donors, and the public;  · coordinating the organ matching and placement process;  · collecting information about every organ transplant and donation that occurs in the [place].    Remember, you should contact your transplant center directly if you have questions or concerns about your own medical care including medical records, work-up progress and test reports. [facility] is not your transplant center, and staff at [facility] will not be able to transfer you to your transplant center, so keep your transplant centers phone number handy. But, while you research your transplant needs and learn as much as you can about transplantation and donation, we welcome your call to our toll-free patient services line at [phone].

## 2019-03-22 NOTE — PROGRESS NOTES
As per communication from Dr. Gotti who is currently in clinic:  Pt to be made inactive on the transplant list.  Pt with diabetic ulcer(s)  and 'fracture' at this time.  Note not yet fully dictated.  Inactivated as per MD order.

## 2019-03-22 NOTE — PROGRESS NOTES
KIDNEY, KIDNEY/PANCREAS, PANCREAS TRANSPLANT RECIPIENT REEVALUATION    Requesting Physician: Dr. Brendan BULL     CC:   Six monthly/Annual reassessment of kidney transplant candidacy.    HPI:  Ms. Valencia is a 68 y.o. year old Black or  female with ESRD secondary to diabetic nephropathy.  She has been on the wait list for a kidney transplant at [facility] since 6/3/2015. Patient is currently on hemodialysis started on 6/3/2015. Patient is dialyzing on TTS schedule.  Patient reports that she is tolerating dialysis well.. She has a RUE AV fistula.      - hospitalization at our [facility] from 12/14/18  through 1/22/19 on account of closed fracture of the left ankle, hypertension, end-stage renal disease requiring dialysis, diabetes, Underwent ORIF of the left ankle on 12/18/18 on account of trimalleolar ankle fracture with dislocation. She was sent back to the ER with concerns of postoperative infection on 1/4/19. She did have an I&D of the left ankle hematoma with wound VAC placed by Dr. Goldstein. She was initially placed on IV vancomycin and Zosyn. There was no growth detected on wound culture. She had a wound VAC removed on 1/7/19. She was on Lovenox for DVT prophylaxis.    - She did have a  angiogram of the lower extremity by Dr. Hutchison and had left SFA stenting in Jan 2019.  - she has 4 non healing wound         Functional Status: She is on wheelchair since Jan 2019. Can not walk  Previous Transplant: no  Previous Blood Transfusion: yes  Previous Pregnancy: 2  Anticoagulation/ antiplatelet therapy and reason: no  Potential Donor: no  High KDPI candidate: no due to weight   Meets center eligibility for accepting HCV+ donor offer: yes      Past Medical History:  Past Medical History:   Diagnosis Date    Arthritis     Breast cancer s/p left mastectomy 2007     No chemotherapy or XRT, rx'd with fumera x5 yrs    Chronic kidney disease (CKD), stage V     Diabetes mellitus, type 2 age  47 2015    Diabetic nephropathy     Encounter for blood transfusion     ESRD on dialysis     Heart murmur     Hypertension     Neuromuscular disorder     Obesity (BMI 30.0-34.9) 2017    Seizures     Type II diabetes mellitus with neuropathy        Past Surgical History:  Past Surgical History:   Procedure Laterality Date    AVG surgery Right      SECTION      EYE SURGERY      MASTECTOMY Left     TUBAL LIGATION           Family History:  Family History   Problem Relation Age of Onset    Hypertension Mother     Diabetes Mother     Cancer Mother         colon ca    Diabetes Father     Heart disease Father     Hypertension Father     Diabetes Sister     Diabetes Brother     Kidney disease Daughter         proteinuria    Diabetes Daughter        Social History:  Social History     Socioeconomic History    Marital status: Single     Spouse name: Not on file    Number of children: Not on file    Years of education: Not on file    Highest education level: Not on file   Social Needs    Financial resource strain: Not on file    Food insecurity - worry: Not on file    Food insecurity - inability: Not on file    Transportation needs - medical: Not on file    Transportation needs - non-medical: Not on file   Occupational History    Occupation: in sales     Comment: works part time   Tobacco Use    Smoking status: Former Smoker    Smokeless tobacco: Never Used    Tobacco comment: quit 40+ years ago   Substance and Sexual Activity    Alcohol use: No    Drug use: No    Sexual activity: Not on file   Other Topics Concern    Not on file   Social History Narrative    3 grankids. Retired .           Current Medication  Current Outpatient Medications   Medication Sig Dispense Refill    amlodipine (NORVASC) 10 MG tablet Take 10 mg by mouth.      atorvastatin (LIPITOR) 80 MG tablet Take 80 mg by mouth.      B complex-vitamin C-folic acid (NEPHRO-ANDI) 0.8 mg Tab Take 1  "tablet by mouth once daily.       calcitRIOL (ROCALTROL) 0.5 MCG Cap Take 0.5 mcg by mouth.      carvedilol (COREG) 25 MG tablet Take 25 mg by mouth.      cloNIDine (CATAPRES) 0.1 MG tablet Take 0.1 mg by mouth.      cloNIDine 0.3 mg/24 hr td ptwk (CATAPRES) 0.3 mg/24 hr       ergocalciferol (VITAMIN D2) 50,000 unit Cap Take 50,000 Units by mouth every 7 days.       HUMALOG KWIKPEN 100 unit/mL InPn pen Inject 4 Units into the skin 3 (three) times daily before meals.       hydrALAZINE (APRESOLINE) 100 MG tablet Take 100 mg by mouth every 8 (eight) hours.       hydrocodone-acetaminophen 10-325mg (NORCO)  mg Tab Take 1 tablet by mouth daily as needed.       insulin glargine 300 unit/mL (1.5 mL) InPn Inject 30 Units into the skin once daily.       insulin needles, disposable, (BD ULTRA-FINE MADELINE PEN NEEDLES) 32 x 5/32 " Ndle Use with Victoza daily      irbesartan (AVAPRO) 300 MG tablet Take 300 mg by mouth once daily.       loratadine (CLARITIN) 10 mg tablet Take 10 mg by mouth.      omeprazole (PRILOSEC) 40 MG capsule Take 40 mg by mouth daily as needed.       pantoprazole (PROTONIX) 40 MG tablet Take 40 mg by mouth daily as needed.       pen needle, diabetic 32 gauge x 5/32" Ndle Use with Victoza daily      sevelamer carbonate (RENVELA) 800 mg Tab Take 800 mg by mouth.      torsemide (DEMADEX) 100 MG Tab 100 mg once daily.       aspirin 81 MG Chew Take 81 mg by mouth once daily.      lactulose (CHRONULAC) 20 gram/30 mL Soln Take 20 g by mouth daily as needed.      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   Respiratory: Denies " shortness of breath, dyspnea on exertion, orthopnea, wheezing, hemoptysis,  Cardiovascular: Denies chest pain, palpitations, history of MI, history of stroke, history of DVT  Gastrointestinal: Denies abdominal pain, nausea/vomiting/diarrhea/constipation. Denies history of GI bleeding or ulcers. +ve GERD on PPI daily.    Genitourinary: Denies history of kidney stones, recurrent UTI's, history of urinary obstruction, hematuria, dysuria, urinary frequency, incontinence  Musculoskeletal: Denies trouble moving extremities, denies joint pain or swelling, +ve for claudication, follow up with vascular 5/25/2018 , left lower ext fracture and infection, unhealed wound   Skin: 1/2018 history of skin cancer ( frozen section ) by Dr Gardner in [facility] , denies rash, ulcerations  Heme/onc: + history of breast cancer and skin cancer , denies history of coagulopathies or bleeding disorders  Endocrine: Denies thyroid disease, unintentional weight loss/weight gain  Neurological: Denies tremors, seizures, dizziness, headaches, peripheral neuropathy  Psychiatric: Denies anxiety, depression. Denies hallucinations or delusions.      OBJECTIVE       Body mass index is 32.12 kg/m².    Vitals:    03/22/19 1236   BP: 131/67   Pulse: 81   Resp: 17   Temp: 97.7 °F (36.5 °C)       Physical Exam  General: No acute distress, well groomed, alert and oriented x 3  HEENT: Normocephalic, left blindness  Neck: Supple, symmetrical, trachea midline, no masses, no carotid bruits  Respiratory: Clear to auscultation bilaterally, respirations unlabored, no rales  Cardiovacular: Regular rate and rhythm, S1, S2 normal, no murmurs, no rubs or gallops   Gastrointestinal: Soft, non-tender, bowel sounds normal  Extremities: No clubbing or cyanosis of upper extremities , Left lower ext on cast,   Skin: warm and dry; no rash on exposed skin. Punched out skin lesion at the site of derm Bx on the anterior mid left leg.   Musculoskeletal: moves  all extremities without difficulty, FROM, 5/5 strength, can not ambulate due to left lower ext fracture , on boot, and bandage, 4 small wounds  Psychiatric: Normal mood and affect. Responds appropriately to questions.          Labs:  Reviewed with the patient      ASSESSMENT     1. ESRD on dialysis    2. Diabetic nephropathy associated with type 2 diabetes mellitus    3. Malignant neoplasm of left female breast, unspecified estrogen receptor status, unspecified site of breast    4. Organ transplant candidate    5. Obesity (BMI 30.0-34.9)        PLAN       Transplant Candidacy:    Ms. Valencia is a marginal for kidney transplantation.    Meets center eligibility for accepting HCV+ donor offer - yes.  Patient educated on HCV+ donors. Yessi is willing to accept HCV+ donor offer - yes   Patient is a candidate for KDPI > 85 kidney donor offer - no.   She has experienced health changes that warrant further investigation.  Patient will be placed in an inactive status at present.          - If she is activated in the list sometime in future, she needs CT of pelvis in 2020, and iliac doppler.

## 2019-03-22 NOTE — LETTER
March 22, 2019        Oliver Cardona  [address]  [place] [address]  Phone: [phone]  Fax: [phone]             [facility]  [address]  [facility] [address]  Phone: [phone]   Patient: Yessi Valencia   MR Number: [id number]   YOB: 1950   Date of Visit: 3/22/2019       Dear Dr. Oliver Cardona    Thank you for referring Yessi Vlaencia to me for evaluation. Attached you will find relevant portions of my assessment and plan of care.    If you have questions, please do not hesitate to call me. I look forward to following Yessi Valencia along with you.    Sincerely,    Tere Gotti MD    Enclosure    If you would like to receive this communication electronically, please contact [email or web address] or [phone] to request Social [employer] Simplified Link access.    [employer] Simplified Link is a tool which provides read-only access to select patient information with whom you have a relationship. Its easy to use and provides real time access to review your patients record including encounter summaries, notes, results, and demographic information.    If you feel you have received this communication in error or would no longer like to receive these types of communications, please e-mail [email or web address]

## 2019-03-27 ENCOUNTER — SOCIAL WORK (OUTPATIENT)
Dept: TRANSPLANT | Facility: CLINIC | Age: 69
End: 2019-03-27

## 2019-03-27 NOTE — PROGRESS NOTES
"According to dialysis unit medical record, in the last three months pt has, 3 AMAs (1/4-pt choice, 1/28-trans[poration, 1/28-transportation and 2/26-pt choice) , 4  No Shows (1/26-transportation, 2/12-transportation, 3/1-transportation and 3/12 pt in pain). "On 3/21 pt's Phos was 7.2 and pt has ap[plied for binders. Pt's PTH was 360 on 3/7. Pt is dependent on family for transportation while pt's heal is fractured"        Confirmed by dialysis unit-Paula Desouza LCSW       "

## 2019-06-25 DIAGNOSIS — Z76.82 ORGAN TRANSPLANT CANDIDATE: Primary | ICD-10-CM

## 2019-06-25 NOTE — PROGRESS NOTES
YEARLY LIST MANAGEMENT NOTE    Yessi Valencia's kidney transplant listing status reviewed.  Patient is due for follow-up appointments in August 2019.  Appointments will be scheduled per protocol.  HLA sample is current and being rec'd on a regular basis.

## 2019-09-04 ENCOUNTER — TELEPHONE (OUTPATIENT)
Dept: TRANSPLANT | Facility: CLINIC | Age: 69
End: 2019-09-04

## 2019-10-14 ENCOUNTER — TELEPHONE (OUTPATIENT)
Dept: TRANSPLANT | Facility: CLINIC | Age: 69
End: 2019-10-14

## 2019-11-07 DIAGNOSIS — Z76.82 ORGAN TRANSPLANT CANDIDATE: Primary | ICD-10-CM

## 2019-12-11 ENCOUNTER — OFFICE VISIT (OUTPATIENT)
Dept: TRANSPLANT | Facility: CLINIC | Age: 69
End: 2019-12-11
Payer: MEDICARE

## 2019-12-11 VITALS
HEART RATE: 87 BPM | DIASTOLIC BLOOD PRESSURE: 78 MMHG | RESPIRATION RATE: 16 BRPM | HEIGHT: 64 IN | TEMPERATURE: 98 F | WEIGHT: 203.25 LBS | SYSTOLIC BLOOD PRESSURE: 155 MMHG | OXYGEN SATURATION: 99 % | BODY MASS INDEX: 34.7 KG/M2

## 2019-12-11 DIAGNOSIS — N32.0 BLADDER OUTLET OBSTRUCTION: ICD-10-CM

## 2019-12-11 DIAGNOSIS — I10 ESSENTIAL HYPERTENSION: ICD-10-CM

## 2019-12-11 DIAGNOSIS — Z99.2 ESRD ON DIALYSIS: Primary | Chronic | ICD-10-CM

## 2019-12-11 DIAGNOSIS — N18.6 ESRD ON DIALYSIS: Primary | Chronic | ICD-10-CM

## 2019-12-11 DIAGNOSIS — Z76.82 PATIENT ON WAITING LIST FOR KIDNEY TRANSPLANT: Chronic | ICD-10-CM

## 2019-12-11 DIAGNOSIS — E11.21 DIABETIC NEPHROPATHY ASSOCIATED WITH TYPE 2 DIABETES MELLITUS: Chronic | ICD-10-CM

## 2019-12-11 PROCEDURE — 1126F PR PAIN SEVERITY QUANTIFIED, NO PAIN PRESENT: ICD-10-PCS | Mod: TXP,,, | Performed by: NURSE PRACTITIONER

## 2019-12-11 PROCEDURE — 99999 PR PBB SHADOW E&M-EST. PATIENT-LVL V: ICD-10-PCS | Mod: PBBFAC,TXP,, | Performed by: NURSE PRACTITIONER

## 2019-12-11 PROCEDURE — 99215 OFFICE O/P EST HI 40 MIN: CPT | Mod: PBBFAC,TXP | Performed by: NURSE PRACTITIONER

## 2019-12-11 PROCEDURE — 1159F MED LIST DOCD IN RCRD: CPT | Mod: TXP,,, | Performed by: NURSE PRACTITIONER

## 2019-12-11 PROCEDURE — 99215 PR OFFICE/OUTPT VISIT, EST, LEVL V, 40-54 MIN: ICD-10-PCS | Mod: S$PBB,TXP,, | Performed by: NURSE PRACTITIONER

## 2019-12-11 PROCEDURE — 1126F AMNT PAIN NOTED NONE PRSNT: CPT | Mod: TXP,,, | Performed by: NURSE PRACTITIONER

## 2019-12-11 PROCEDURE — 1159F PR MEDICATION LIST DOCUMENTED IN MEDICAL RECORD: ICD-10-PCS | Mod: TXP,,, | Performed by: NURSE PRACTITIONER

## 2019-12-11 PROCEDURE — 99215 OFFICE O/P EST HI 40 MIN: CPT | Mod: S$PBB,TXP,, | Performed by: NURSE PRACTITIONER

## 2019-12-11 PROCEDURE — 99999 PR PBB SHADOW E&M-EST. PATIENT-LVL V: CPT | Mod: PBBFAC,TXP,, | Performed by: NURSE PRACTITIONER

## 2019-12-11 NOTE — PROGRESS NOTES
Transplant Recipient Adult Psychosocial Assessment  12-11-19 UPDATE    Yessi Valencia  [address] [facility] [address] [place] [address]  Telephone Information:   Mobile [phone]   Home  [phone] (home)  Work  There is no work phone number on file.  E-mail  [email or web address]    Sex: female  YOB: 1950  Age: 69 y.o.    Encounter Date: 12/11/2019  [place] Citizen: yes  Primary Language: English   Needed: no    Emergency Contact:  Linda Valencia, 47 yo daughter, Gerry [place], does drive/own car, works full time at [employer]. [phone]    Family/Social Support:   Number of dependents/: pt denies  Marital history: single, never   Other family dynamics: Pt reports having 3 supportive daughters living nearby and they will be assisting with organ transplant. Pt's daughter Janay Acuña with patient for session today.    Household Composition:  Pt reports lives alone.    Do you and your caregivers have access to reliable transportation? yes pt reports drives self/own car. All listed caregivers drive/own car.  PRIMARY CAREGIVER: Linda Valencia will be primary caregiver, phone number [phone]     provided in-depth information to patient and caregiver regarding pre- and post-transplant caregiver role.   strongly encourages patient and caregiver to have concrete plan regarding post-transplant care giving, including back-up caregiver(s) to ensure care giving needs are met as needed.    Patient and Caregiver states understanding all aspects of caregiver role/commitment and is able/willing/committed to being caregiver to the fullest extent necessary.    Patient and Caregiver verbalizes understanding of the education provided today and caregiver responsibilities.         remains available. Patient and Caregiver agree to contact  in a timely manner if concerns arise.      Able to take time off work without financial  concerns: yes.     Additional Significant Others who will Assist with Transplant:  Janay Acuña 48 yo daughter, Gerry [place], does drive/own car, disabled/not working.   eDshawn Mendez, 28 yo daughter, Gerry [place], does drive/own car, works full time as Sgt at [employer]. [phone]    Living Will: no  Healthcare Power of : no  Advance Directives on file: <<no information> per medical record.  Verbally reviewed LW/HCPA information.   provided patient with copy of LW/HCPA documents and provided education on completion of forms.    Living Donors: Education and resource information given to patient.    Highest Education Level: Attended College/Technical School  Reading Ability: 12th grade  Reports difficulty with: seeing left eye partial blindness; sees well with right eye  Learns Best By:  multisensory     Status: no  VA Benefits: no     Working for Income: yes  If yes, working activity level: Working Part Time Due to Disability  Patient reports working a couple days week as . Pt reports is retired from [employer] since 2015 after working 20 years there.    Spouse/Significant Other Employment: pt reports is not     Disabled: Currently obtaining Social Security FPC. Was disabled ESRD and diabetes 5/2014     Monthly Income:  $981 SS FPC  Able to afford all costs now and if transplanted, including medications: Pt reports AKF assists with $150 monthly BCBS premiums; pt and daughter report understanding AKF assistance will end at transplant. Pt reports plan to transplant fund raise; NFT brochure provided and reviewed. Pt reported to transplant nephrologist unable to afford $1200 dental work bc it is not covered by her insurance. Transplant  will let dialysis social worker know and have dialysis social worker provide patient with dental care resources in patient's community.    Patient and Caregiver verbalizes understanding of  personal responsibilities related to transplant costs and the importance of having a financial plan to ensure that patients transplant costs are fully covered.       provided fundraising information/education. Patient and Caregiververbalizes understanding.   remains available.    Insurance:   Payor/Plan Subscr  Sex Relation Sub. Ins. ID Effective Group Num   1. MEDICARE - ME* KANWAL EMERSON 1950 Female  [id number] 9/1/15                                    [address]   2. BLUE CROSS BL* KANWAL EMERSON 1950 Female  [id number]                                   [address] [address]     Primary Insurance (for UNOS reporting): Public Insurance - Medicare FFS (Fee For Service)  Secondary Insurance (for UNOS reporting): Private Insurance AKF assists patient with $150 monthly premiums. Pt and daughter report understanding AKF assistance will end at transplant.  Patient and Caregiver verbalizes clear understanding that patient may experience difficulty obtaining and/or be denied insurance coverage post-surgery. This includes and is not limited to disability insurance, life insurance, health insurance, burial insurance, long term care insurance, and other insurances.      Patient and Caregiver also reports understanding that future health concerns related to or unrelated to transplantation may not be covered by patient's insurance.  Resources and information provided and reviewed.     Patient and Caregiver provides verbal permission to release any necessary information to outside resources for patient care and discharge planning.  Resources and information provided are reviewed.      [facility], [phone]. Hemodialysis: Tues, Thurs and Sat 3.5 hours.    Dialysis Adherence: Pt reports dialysis compliance was compromised during when she was recovering from her ankle medical problem. Pt reports did miss a lot of treatments bc she could not drive  self. Pt reports is now cleared to drive and does not miss treatments. Pt and daughter report understanding dialysis non compliance will impact organ transplant listing. Transplant  will send dialysis compliance update form for dialysis unit completion.    Infusion Service: patient utilizing? no  Home Health: patient utilizing? no [facility] has been d/c  DME: yes, ankle brace, cane, rolliator, bed side commode, bp cuff, w/c   Pulmonary/Cardiac Rehab: pt denies  ADLS:  Problems ambulating and uses cane. Reports has been cleared to drive and does drive. Pt reports lives alone and is independent with self care including managing medicines.    Adherence:   Pt reports is currently highly compliant with all medical appointments and instructions. Pt did report to transplant nephrologist not having money or medical coverage for needed dental care. Dialysis unit sw will be informed and will be asked to provide patient with community dental resources. Adherence education and counseling provided.     Per History Section:  Past Medical History:   Diagnosis Date    Arthritis     Breast cancer s/p left mastectomy 2007     No chemotherapy or XRT, rx'd with fumera x5 yrs    Chronic kidney disease (CKD), stage V     Diabetes mellitus, type 2 age 47 5/27/2015    Diabetic nephropathy     Encounter for blood transfusion     ESRD on dialysis     Heart murmur     Hypertension     Neuromuscular disorder     Obesity (BMI 30.0-34.9) 8/25/2017    Seizures     Type II diabetes mellitus with neuropathy      Social History     Tobacco Use    Smoking status: Former Smoker    Smokeless tobacco: Never Used    Tobacco comment: quit 40+ years ago   Substance Use Topics    Alcohol use: No     Social History     Substance and Sexual Activity   Drug Use No     Social History     Substance and Sexual Activity   Sexual Activity Not on file       Per Today's Psychosocial:  Please refer to above table for patient's  reported substance use history.    Patient and Caregiver states clear understanding of the potential impact of substance use as it relates to transplant candidacy and is aware of possible random substance screening.  Substance abstinence/cessation counseling, education and resources provided and reviewed.     Arrests/DWI/Treatment/Rehab: patient denies    Psychiatric History:    Mental Health: pt denies any mental health history or current concerns at this time.  Psychiatrist/Counselor: pt denies  Medications:  Pt denies  Suicide/Homicide Issues: pt denies any si/hi history  Safety at home: pt reports is living alone in safe home environment with no abuse.    Knowledge: Patient and Caregiver states having clear understanding and realistic expectations regarding the potential risks and potential benefits of organ transplantation and organ donation and agrees to discuss with health care team members and support system members, as well as to utilize available resources and express questions and/or concerns in order to further facilitate the pt informed decision-making.  Resources and information provided and reviewed.    Patient and Caregiver is aware of [facility]'s affiliation and/or partnership with agencies in home health care, LTAC, SNF, [facility], and other hospitals and clinics.    Understanding: Patient and Caregiver reports having a clear understanding of the many lifetime commitments involved with being a transplant recipient, including costs, compliance, medications, lab work, procedures, appointments, concrete and financial planning, preparedness, timely and appropriate communication of concerns, abstinence (ETOH, tobacco, illicit non-prescribed drugs), adherence to all health care team recommendations, support system and caregiver involvement, appropriate and timely resource utilization and follow-through, mental health counseling as needed/recommended, and patient and caregiver responsibilities.  Social Service  Handbook, resources and detailed educational information provided and reviewed.  Educational information provided.    Patient and Caregiver also reports current and expected compliance with health care regime and states having a clear understanding of the importance of compliance.      Patient and Caregiver reports a clear understanding that risks and benefits may be involved with organ transplantation and with organ donation.       Patient and Caregiver also reports clear understanding that psychosocial risk factors may affect patient, and include but are not limited to feelings of depression, generalized anxiety, anxiety regarding dependence on others, post traumatic stress disorder, feelings of guilt and other emotional and/or mental concerns, and/or exacerbation of existing mental health concerns.  Detailed resources provided and discussed.      Patient and Caregiver agrees to access appropriate resources in a timely manner as needed and/or as recommended, and to communicate concerns appropriately.  Patient and Caregiver also reports a clear understanding of treatment options available.     Patient and Caregiver received education in a group setting.   reviewed education, provided additional information, and answered questions.    Feelings or Concerns: Pt reports high motivation to pursue organ transplant at this time.    Coping: Identify Patient & Caregiver Strategies to [place]:   1. Currently & Pre-transplant - family support and mia/prayer               2. At the time of surgery - family support; mia/prayer   3. During post-Transplant & Recovery Period - family support; mia/prayer      Goals: Pt reports hope for successful organ transplant so she may discontinue dialysis and have healthier life.    Interview Behavior: Patient and Caregiver presents as alert and oriented x 4, pleasant, good eye contact, well groomed, recall good, concentration/judgement good, average intelligence, calm,  communicative, cooperative and asking and answering questions appropriately. Pt's supportive daughter Janay in session with patient's permission.         Transplant Social Work - Candidacy  Assessment/Plan:     Psychosocial Suitability: Patient presents as an acceptable candidate for kidney transplant at this time. Based on psychosocial risk factors, patient presents as medium risk, due to patient reporting having low income and having difficulties affording needed dental care and due to patient reporting needing AKF assistance for BCBS monthly medical premiums which may end at transplant. Pt and daughter report plan to fund raise through patient's [employer]. Pt reports having transplant caregiver/transportation plan and medical insurance plan both in place.    Recommendations/Additional Comments: Dialysis compliance update needed and form sent to unit for completion. Pt reports lives away and will need transplant lodging; lodging was reviewed in detail. Pt reports AKF assists with $150 monthly premium and pt/daughter reports understanding AKF assistance may end at transplant. Pt/daughter report plan to fund raise; fund raising info reviewed in detail today. Pt reported to transplant nephrologist unable to afford needed dental care; transplant  will inform dialysis unit sw to provide dental resources to patient at next dialysis visit.    Madai Wylie MSW LCSW

## 2019-12-11 NOTE — PROGRESS NOTES
"PHARM.D. PRE-TRANSPLANT NOTE:    This patient's medication therapy was evaluated as part of her pre-transplant evaluation.      The following general pharmacologic concerns were noted: aspirin 81mg    The following pharmacologic concerns related to HCV therapy were noted: none      This patient's medication profile was reviewed for considerations for DAA Hepatitis C therapy:    [x]  No current inducers of CYP 3A4 or PGP  [x]  No amiodarone on this patient's EMR profile in the last 24 months  [x]  No past or current atrial fibrillation on this patient's EMR profile       Current Outpatient Medications   Medication Sig Dispense Refill    cloNIDine 0.3 mg/24 hr td ptwk (CATAPRES) 0.3 mg/24 hr       amlodipine (NORVASC) 10 MG tablet Take 10 mg by mouth.      aspirin 81 MG Chew Take 81 mg by mouth once daily.      atorvastatin (LIPITOR) 80 MG tablet Take 80 mg by mouth.      B complex-vitamin C-folic acid (NEPHRO-ANDI) 0.8 mg Tab Take 1 tablet by mouth once daily.       calcitRIOL (ROCALTROL) 0.5 MCG Cap Take 0.5 mcg by mouth.      carvedilol (COREG) 25 MG tablet Take 25 mg by mouth.      cloNIDine (CATAPRES) 0.1 MG tablet Take 0.1 mg by mouth.      ergocalciferol (VITAMIN D2) 50,000 unit Cap Take 50,000 Units by mouth every 7 days.       HUMALOG KWIKPEN 100 unit/mL InPn pen Inject 4 Units into the skin 3 (three) times daily before meals.       hydrALAZINE (APRESOLINE) 100 MG tablet Take 100 mg by mouth every 8 (eight) hours.       hydrocodone-acetaminophen 10-325mg (NORCO)  mg Tab Take 1 tablet by mouth daily as needed.       insulin glargine 300 unit/mL (1.5 mL) InPn Inject 30 Units into the skin once daily.       insulin needles, disposable, (BD ULTRA-FINE MADELINE PEN NEEDLES) 32 x 5/32 " Ndle Use with Victoza daily      irbesartan (AVAPRO) 300 MG tablet Take 300 mg by mouth once daily.       lactulose (CHRONULAC) 20 gram/30 mL Soln Take 20 g by mouth daily as needed.       loratadine (CLARITIN) 10 " "mg tablet Take 10 mg by mouth.      omeprazole (PRILOSEC) 40 MG capsule Take 40 mg by mouth daily as needed.       pantoprazole (PROTONIX) 40 MG tablet Take 40 mg by mouth daily as needed.       pen needle, diabetic 32 gauge x 5/32" Ndle Use with Victoza daily      SENSIPAR 30 mg Tab       sevelamer carbonate (RENVELA) 800 mg Tab Take 800 mg by mouth.      torsemide (DEMADEX) 100 MG Tab 100 mg once daily.        Current Facility-Administered Medications   Medication Dose Route Frequency Provider Last Rate Last Dose    heparin (porcine) injection 2,000 Units  2,000 Units Intravenous PRN Amparo Simeon MD             Currently Ms Valencia is responsible for preparing / administering this patient's medications on a daily basis.  I am available for consultation and can be contacted, as needed by the other members of the Kidney Transplant team.   "

## 2019-12-11 NOTE — PROGRESS NOTES
Kidney Transplant Recipient Reevalulation    Referring Physician: Oliver Cardona  Current Nephrologist: Oliver Cardona  Waitlist Status: inactive  Dialysis Start Date: 6/3/2015    Subjective:     CC:  Six month reassessment of kidney transplant candidacy.    HPI:  Ms. Valencia is a 69 y.o. year old Black or  female with ESRD secondary to diabetic nephropathy and HTN.  She has been on the wait list for a kidney transplant at [facility] since 6/3/2015. Patient is currently on hemodialysis started on 6/3/2015. Patient is dialyzing on TTS schedule.  Patient reports that she is tolerating dialysis well.. She has a LUE AV fistula. Patient denies any recent hospitalizations or ED visits.    Inactive since 3/2019 for active non healing  diabetic foot ulcers   Pt states she has been d/c'd from ortho and foot wounds have healed.      Bilateral hip pain, recently evaluated by sports medicine 11/2019  Stets she need dental work and extractions but dental not covered by insurance and she does not have ~ $1100 to pay for the work.     fx assessment  She is wearing an ankle brace and uses a cane for support d/t LE weakness from the fracture. She is in PT, doing well. She works part time~ 1-x2/week as a .     REVIEWED IN CLINIC-->MMG and abd  US 8/2019 (-)      Past Medical History:   Diagnosis Date    Arthritis     Breast cancer s/p left mastectomy 2007     No chemotherapy or XRT, rx'd with fumera x5 yrs    Chronic kidney disease (CKD), stage V     Diabetes mellitus, type 2 age 47 5/27/2015    Diabetic nephropathy     Encounter for blood transfusion     ESRD on dialysis     Heart murmur     Hypertension     Neuromuscular disorder     Obesity (BMI 30.0-34.9) 8/25/2017    Seizures     Type II diabetes mellitus with neuropathy        Review of Systems   Constitutional: Negative for activity change, appetite change, chills, fatigue, fever and unexpected weight change.   HENT: Negative for congestion,  "facial swelling, postnasal drip, rhinorrhea, sinus pressure, sore throat and trouble swallowing.    Eyes: Negative for pain, redness and visual disturbance.   Respiratory: Negative for cough, chest tightness, shortness of breath and wheezing.    Cardiovascular: Positive for leg swelling. Negative for chest pain and palpitations.   Gastrointestinal: Positive for abdominal distention. Negative for abdominal pain, diarrhea, nausea and vomiting.   Genitourinary: Positive for decreased urine volume. Negative for dysuria, flank pain and urgency.   Musculoskeletal: Positive for arthralgias and gait problem. Negative for neck pain and neck stiffness.        Hips  Bilaterally  Uses a cane for distances    Skin: Negative for rash.   Allergic/Immunologic: Negative for environmental allergies, food allergies and immunocompromised state.   Neurological: Negative for dizziness, weakness, light-headedness and headaches.   Psychiatric/Behavioral: Negative for agitation and confusion. The patient is not nervous/anxious.        Objective:   body mass index is 35.13 kg/m².  BP (!) 155/78 (BP Location: Right arm, Patient Position: Sitting, BP Method: Medium (Automatic))   Pulse 87   Temp 97.6 °F (36.4 °C) (Oral)   Resp 16   Ht 5' 3.78" (1.62 m)   Wt 92.2 kg (203 lb 4.2 oz)   SpO2 99%   BMI 35.13 kg/m²     Physical Exam   Constitutional: She is oriented to person, place, and time. She appears well-developed and well-nourished.   HENT:   Head: Normocephalic.   Mouth/Throat: Oropharynx is clear and moist. No oropharyngeal exudate.   Eyes: Pupils are equal, round, and reactive to light. Conjunctivae and EOM are normal. No scleral icterus.   Neck: Normal range of motion. Neck supple.   Cardiovascular: Normal rate, regular rhythm and normal heart sounds.   Pulmonary/Chest: Effort normal and breath sounds normal.   Abdominal: Soft. Normal appearance and bowel sounds are normal. She exhibits distension. She exhibits no mass. There is no " splenomegaly or hepatomegaly. There is no tenderness. There is no rebound, no guarding, no CVA tenderness, no tenderness at McBurney's point and negative Christopher's sign.   Musculoskeletal: Normal range of motion. She exhibits edema.        Arms:  LLE, ankle brace, +2 peripheral edema with erythea  RLE +1 peripheral edema    Lymphadenopathy:     She has no cervical adenopathy.   Neurological: She is alert and oriented to person, place, and time. She exhibits normal muscle tone. Coordination normal.   Skin: Skin is warm and dry.   Psychiatric: She has a normal mood and affect. Her behavior is normal.   Vitals reviewed.      Labs:  Lab Results   Component Value Date    WBC 7.35 05/27/2015    HGB 8.9 (L) 05/27/2015    HCT 28.8 (L) 05/27/2015     05/27/2015    K 4.3 05/27/2015     05/27/2015    CO2 25 05/27/2015    BUN 72 (H) 05/27/2015    CREATININE 4.5 (H) 05/27/2015    EGFRNONAA 9.8 (A) 05/27/2015    CALCIUM 9.0 05/27/2015    PHOS 4.5 05/27/2015    ALBUMIN 2.9 (L) 05/27/2015    AST 19 05/27/2015    ALT 26 05/27/2015    .0 (H) 03/22/2019       No results found for: PREALBUMIN, BILIRUBINUA, GGT, AMYLASE, LIPASE, PROTEINUA, NITRITE, RBCUA, WBCUA    No results found for: HLAABCTYPE    Lab Results   Component Value Date    CPRA 1 11/08/2019    IM3PFXO A34 11/08/2019    CIABCLM WEAK Cw16 08/13/2019    CIIAB Negative 11/08/2019       Labs were reviewed with the patient.    Pre-transplant Workup:   Reviewed with the patient.    Assessment:     1. ESRD on dialysis    2. Patient on waiting list for kidney transplant    3. Essential hypertension    4. Diabetic nephropathy associated with type 2 diabetes mellitus    5. Bladder outlet obstruction, Chronic (as per CT 10/2015)         Plan:   colonoscopy due 4/2020  GYN/PAP DUE  A2-B consent in media signed and dated 5/11/2018  Dr VU Solano in [place]--get clearance fracture has healed  Encouraged to get dental work completed prior to getting a  transplanted     Transplant Candidacy:   Ms. Valencia is a suitable kidney transplant candidate assuming she is cleared by orthopedics  Meets center eligibility for accepting HCV+ donor offer - yes.  Patient educated on HCV+ donors. Yessi is willing  to accept HCV+ donor offer -  yes   Patient is a candidate for KDPI > 85 kidney donor offer - no d/t weight.  She remains inactive until cleared by orthopedic, otherwise she is  overall stable health   Janay Vallejo NP       Follow-up:   In addition to the tests noted in the plan, Ms. Valencia will continue to have reevaluation as per the standing pre-kidney transplant protocol:  1. Monthly blood for PRA  2. Annual return to clinic, except HIV positive, > 65 years of age, or pancreas transplant candidates who will be scheduled to see transplant every 6 months while in pre-transplant phase  3. Annual re-testing: CXR, EKG, yearly mammograms for women over 40 and PSA for males over 40, cardiology follow-up as recommended by initial cardiology pre-transplant evaluation  4. Renal ultrasound every 2 years  5. Baseline colonoscopy after age 50 and repeated as recommended    UNOS Patient Status  Functional Status: 60% - Requires occasional assistance but is able to care for needs  Physical Capacity: No Limitations

## 2019-12-20 NOTE — PROGRESS NOTES
Patient's chart reviewed today. Patient due for follow-up in June 2020. Appointments will be scheduled per protocol. HLA sample current, in lab, and being received on a regular basis. Requested most recent progress note from Dr. Goldstein (orthopedics) or clearance, [phone].

## 2019-12-23 ENCOUNTER — SOCIAL WORK (OUTPATIENT)
Dept: TRANSPLANT | Facility: CLINIC | Age: 69
End: 2019-12-23

## 2019-12-23 NOTE — PROGRESS NOTES
"[facility], [phone]. Hemodialysis: Tues, Thurs and Sat 3.5 hours.    12-19-19 completed dialysis compliance update.  Within last 3 months:    Current dry weight:  90.5 kg   Most recent pre treatment weight:  93.3 kg  12-17-19    AMA: 12-12-19 ran 1 hours short due to personal business    NO Shows:  10/26/19 "personal business"; 11-9-19 "personal business"; 12-7-19 "too tired, rescheduled"; 12/19/19 "personal business"    Last intact PTH  979  On 12-5-19    No concerns with labs, caregivers, transportation, or mental health.  "Daughter is supportive" and "pt drives herself"    Psychosocial Suitability: Patient presents as an acceptable candidate for kidney transplant at this time. Based on psychosocial risk factors, patient presents as medium risk, due to patient reporting having low income and having difficulties affording needed dental care and due to patient reporting needing AK[employer] assistance for [employer] monthly medical premiums which may end at transplant. Pt and daughter report plan to fund raise through patient's [employer]. Pt reports having transplant caregiver/transportation plan and medical insurance plan both in place.     Recommendations/Additional Comments: Pt has 1 AMA and 4 no shows within last 3 months (1 AMA and 1 no show after compliance counseling at 12-11-19 psychosocial UPDATE). Transplant  recommends doing dialysis compliance update prior to calling patient in for organ transplant surgery. Pt reports lives away and will need transplant lodging; lodging was reviewed in detail. Pt reports [employer] assists with $150 monthly premium and pt/daughter reports understanding AK[employer] assistance may end at transplant. Pt/daughter report plan to fund raise; fund raising info reviewed in detail today. Pt reported to transplant nephrologist unable to afford needed dental care; transplant  will inform dialysis unit sw to provide dental resources to patient at next " dialysis visit.    Final determination of transplant candidacy will be made once work up is complete and reviewed by the selection committee.     Madai NAVARROW

## 2020-02-13 NOTE — PROGRESS NOTES
Discussed & reviewed chart with Dr. Sheriff. LM for pt to return call to clarify wound vs fracture.

## 2020-02-14 NOTE — PROGRESS NOTES
Spoke w/pt who stated that the wounds she experienced and fracture have healed. Pt will address dental work and attempting to do that prior to transplant.

## 2020-02-18 NOTE — PROGRESS NOTES
Reviewed & discussed chart with Dr. Sheriff on 2/14/20. Pt diabetic wounds cleared, however prior to re-activation need to clarify dental work needs. LM for pt to return call.

## 2020-02-19 NOTE — PROGRESS NOTES
Discussed & reviewed chart with Dr. Sheriff on 2/14/20. Ankle fracture healed, however need to clarify dental work. Pt stated she has not seen a dentist yet but knows she has 2 cavities that need to be addressed. The cost is possibly $1100 and pt reports that this is an unexpected cost. I will discuss further with the transplant team.

## 2020-05-13 ENCOUNTER — TELEPHONE (OUTPATIENT)
Dept: TRANSPLANT | Facility: CLINIC | Age: 70
End: 2020-05-13

## 2020-05-13 NOTE — TELEPHONE ENCOUNTER
Returned call to [facility] and spoke with Sharri. Explained to her that pt is still inactive due to dental issues. She reported that she assisted pt with dental resources. Sharri will follow-up with pt to discuss further.  ----- Message from Erlinda Perdomo sent at 5/12/2020  1:11 PM CDT -----  Contact: Gita Ruano       ----- Message -----  From: Bev Perez  Sent: 5/12/2020  12:39 PM CDT  To: Kidney Waitlisted Coordinator    Calling about status transplant work up       Call Back : [phone]

## 2020-06-02 ENCOUNTER — TELEPHONE (OUTPATIENT)
Dept: TRANSPLANT | Facility: CLINIC | Age: 70
End: 2020-06-02

## 2020-06-02 NOTE — TELEPHONE ENCOUNTER
Returned pt's call. Unable to reach her to discuss scheduling appts, inactive status, and extent of dental issues and follow-up. LM for pt to call back at .  ----- Message from Mireya Pereira sent at 6/2/2020  7:26 AM CDT -----  Contact: Pt  The patient is calling for her appt is she due to come in yet??   ----- Message -----  From: Anushka Camejo  Sent: 6/1/2020   2:29 PM CDT  To: Mireya Pereira        ----- Message -----  From: Erlinda Perdomo  Sent: 6/1/2020   1:47 PM CDT  To: [facility] Pre-Kidney Transplant Non-Clinical        ----- Message -----  From: Bert Daniel  Sent: 6/1/2020   9:48 AM CDT  To: Kidney Waitlisted Coordinator    Pt is calling to set-up appt. The call dropped.    Pt# [phone]

## 2020-06-02 NOTE — TELEPHONE ENCOUNTER
Spoke w/pt who stated she is in the process of scheduling a dental appt. Pt reports that she may need two teeth pulled. Explained to pt that we will schedule future RR clinic appt & diagnostics once dental issues have been addressed and cleared. Pt verbalized understanding.  ----- Message from Bert Daniel sent at 6/2/2020 12:14 PM CDT -----  Contact: Pt  Pt was returning phone call.    Pt# [phone]

## 2020-06-04 ENCOUNTER — TELEPHONE (OUTPATIENT)
Dept: TRANSPLANT | Facility: CLINIC | Age: 70
End: 2020-06-04

## 2020-09-03 ENCOUNTER — TELEPHONE (OUTPATIENT)
Dept: TRANSPLANT | Facility: CLINIC | Age: 70
End: 2020-09-03

## 2020-09-17 DIAGNOSIS — Z76.82 ORGAN TRANSPLANT CANDIDATE: Primary | ICD-10-CM

## 2020-10-14 NOTE — PROGRESS NOTES
Spoke w/pt who stated she is scheduled for initial tooth extractions on Mon, 10/26/20. She needs 3 teeth extracted but doesn't know if they're going to remove all at the same time. Reminded pt that we will need dentist's name & number to obtain work-up & clearance. Pt verbalized understanding. We will not schedule RR clinic appt until later.

## 2020-12-09 DIAGNOSIS — Z76.82 ORGAN TRANSPLANT CANDIDATE: Primary | ICD-10-CM

## 2020-12-09 NOTE — PROGRESS NOTES
Returned pt's call. Spoke with pt who stated she completed 1 tooth extraction 11/2020, 2 extractions on 12/7/20, and will have 2 more extractions on 1/21/20. Pt is aware that she will need dental clr once completed. LM for [facility] dental [facility] that pt will need written clearance, [phone].

## 2021-01-07 ENCOUNTER — TELEPHONE (OUTPATIENT)
Dept: TRANSPLANT | Facility: CLINIC | Age: 71
End: 2021-01-07

## 2021-02-06 NOTE — LETTER
December 13, 2019        Oliver Cardona  [address]  [place] [address]  Phone: [phone]  Fax: [phone]             [facility]  [address]  [facility] [address]  Phone: [phone]   Patient: Yessi Valencia   MR Number: [id number]   YOB: 1950   Date of Visit: 12/11/2019       Dear Dr. Oliver Cardona    Thank you for referring Yessi Valencia to me for evaluation. Attached you will find relevant portions of my assessment and plan of care.    If you have questions, please do not hesitate to call me. I look forward to following Yessi Valencia along with you.    Sincerely,    Janay Vallejo, NP    Enclosure    If you would like to receive this communication electronically, please contact [email or web address] or [phone] to request [employer] Link access.    [employer] Link is a tool which provides read-only access to select patient information with whom you have a relationship. Its easy to use and provides real time access to review your patients record including encounter summaries, notes, results, and demographic information.    If you feel you have received this communication in error or would no longer like to receive these types of communications, please e-mail [email or web address]        62y Female who is followed by neurology because of stroke.    Stroke.   Left hemisphere symptoms.   Has occluded left ICA.   Seen by vascular team and no intervention to offer.   At this point patient having recurrence of symptoms referable to left ICA.  I would therefore suggest anticoagulation for presumed stump emboli.  Begin IV heparin without bolus.     All above discussed with Dr Ellison (hospitalist) as well as neuro ICU PA and Dr Cantor (Neuro-intervention attending).

## 2021-03-11 ENCOUNTER — TELEPHONE (OUTPATIENT)
Dept: RADIOLOGY | Facility: HOSPITAL | Age: 71
End: 2021-03-11

## 2021-03-12 ENCOUNTER — HOSPITAL ENCOUNTER (OUTPATIENT)
Dept: RADIOLOGY | Facility: HOSPITAL | Age: 71
Discharge: HOME | End: 2021-03-12
Attending: NURSE PRACTITIONER
Payer: MEDICARE

## 2021-03-12 ENCOUNTER — OFFICE VISIT (OUTPATIENT)
Dept: OBSTETRICS AND GYNECOLOGY | Facility: CLINIC | Age: 71
End: 2021-03-12
Payer: MEDICARE

## 2021-03-12 VITALS
SYSTOLIC BLOOD PRESSURE: 138 MMHG | WEIGHT: 194.69 LBS | DIASTOLIC BLOOD PRESSURE: 70 MMHG | HEIGHT: 63 IN | BODY MASS INDEX: 34.5 KG/M2

## 2021-03-12 VITALS — BODY MASS INDEX: 35.93 KG/M2 | WEIGHT: 202.81 LBS | HEIGHT: 63 IN

## 2021-03-12 DIAGNOSIS — Z76.82 ORGAN TRANSPLANT CANDIDATE: ICD-10-CM

## 2021-03-12 DIAGNOSIS — Z78.0 MENOPAUSE: ICD-10-CM

## 2021-03-12 DIAGNOSIS — Z12.31 VISIT FOR SCREENING MAMMOGRAM: ICD-10-CM

## 2021-03-12 DIAGNOSIS — Z85.3 PERSONAL HISTORY OF BREAST CANCER: ICD-10-CM

## 2021-03-12 DIAGNOSIS — Z99.2 ESRD ON DIALYSIS: ICD-10-CM

## 2021-03-12 DIAGNOSIS — N18.6 ESRD ON DIALYSIS: ICD-10-CM

## 2021-03-12 DIAGNOSIS — Z01.419 ENCOUNTER FOR GYNECOLOGICAL EXAMINATION WITHOUT ABNORMAL FINDING: ICD-10-CM

## 2021-03-12 DIAGNOSIS — Z12.4 PAP SMEAR FOR CERVICAL CANCER SCREENING: Primary | ICD-10-CM

## 2021-03-12 PROBLEM — E11.3291 MILD NONPROLIFERATIVE DIABETIC RETINOPATHY OF RIGHT EYE WITHOUT MACULAR EDEMA ASSOCIATED WITH TYPE 2 DIABETES MELLITUS: Status: ACTIVE | Noted: 2017-11-29

## 2021-03-12 PROBLEM — E78.5 HYPERLIPIDEMIA: Status: ACTIVE | Noted: 2019-01-21

## 2021-03-12 PROBLEM — M80.00XG AGE-RELATED OSTEOPOROSIS WITH CURRENT PATHOLOGICAL FRACTURE WITH DELAYED HEALING: Status: ACTIVE | Noted: 2019-08-21

## 2021-03-12 PROBLEM — I77.9 CAROTID ARTERY DISEASE: Status: ACTIVE | Noted: 2019-01-21

## 2021-03-12 PROBLEM — N25.81 HYPERPARATHYROIDISM DUE TO END STAGE RENAL DISEASE ON DIALYSIS: Status: ACTIVE | Noted: 2019-08-21

## 2021-03-12 PROCEDURE — G0101 CA SCREEN;PELVIC/BREAST EXAM: HCPCS | Mod: PBBFAC,TXP | Performed by: OBSTETRICS & GYNECOLOGY

## 2021-03-12 PROCEDURE — 76770 US RETROPERITONEAL COMPLETE: ICD-10-PCS | Mod: 26,TXP,, | Performed by: RADIOLOGY

## 2021-03-12 PROCEDURE — 76770 US EXAM ABDO BACK WALL COMP: CPT | Mod: TC,TXP

## 2021-03-12 PROCEDURE — 72170 X-RAY EXAM OF PELVIS: CPT | Mod: TC,TXP

## 2021-03-12 PROCEDURE — 74176 CT ABD & PELVIS W/O CONTRAST: CPT | Mod: TC,TXP

## 2021-03-12 PROCEDURE — 87624 HPV HI-RISK TYP POOLED RSLT: CPT | Mod: TXP | Performed by: OBSTETRICS & GYNECOLOGY

## 2021-03-12 PROCEDURE — 77063 BREAST TOMOSYNTHESIS BI: CPT | Mod: 26,TXP,, | Performed by: RADIOLOGY

## 2021-03-12 PROCEDURE — 77067 SCR MAMMO BI INCL CAD: CPT | Mod: TC,TXP

## 2021-03-12 PROCEDURE — 88175 CYTOPATH C/V AUTO FLUID REDO: CPT | Performed by: OBSTETRICS & GYNECOLOGY

## 2021-03-12 PROCEDURE — 71046 X-RAY EXAM CHEST 2 VIEWS: CPT | Mod: TC,TXP

## 2021-03-12 PROCEDURE — 77067 SCR MAMMO BI INCL CAD: CPT | Mod: 26,TXP,, | Performed by: RADIOLOGY

## 2021-03-12 PROCEDURE — 77063 MAMMO DIGITAL SCREENING RIGHT WITH TOMO: ICD-10-PCS | Mod: 26,TXP,, | Performed by: RADIOLOGY

## 2021-03-12 PROCEDURE — 71046 X-RAY EXAM CHEST 2 VIEWS: CPT | Mod: 26,TXP,, | Performed by: RADIOLOGY

## 2021-03-12 PROCEDURE — 99999 PR PBB SHADOW E&M-EST. PATIENT-LVL II: ICD-10-PCS | Mod: PBBFAC,,, | Performed by: OBSTETRICS & GYNECOLOGY

## 2021-03-12 PROCEDURE — G0101 CA SCREEN;PELVIC/BREAST EXAM: HCPCS | Mod: S$PBB,,, | Performed by: OBSTETRICS & GYNECOLOGY

## 2021-03-12 PROCEDURE — 74176 CT ABDOMEN PELVIS WITHOUT CONTRAST: ICD-10-PCS | Mod: 26,TXP,, | Performed by: RADIOLOGY

## 2021-03-12 PROCEDURE — 74176 CT ABD & PELVIS W/O CONTRAST: CPT | Mod: 26,TXP,, | Performed by: RADIOLOGY

## 2021-03-12 PROCEDURE — 93978 VASCULAR STUDY: CPT | Mod: TC,TXP

## 2021-03-12 PROCEDURE — 76770 US EXAM ABDO BACK WALL COMP: CPT | Mod: 26,TXP,, | Performed by: RADIOLOGY

## 2021-03-12 PROCEDURE — 99999 PR PBB SHADOW E&M-EST. PATIENT-LVL II: CPT | Mod: PBBFAC,,, | Performed by: OBSTETRICS & GYNECOLOGY

## 2021-03-12 PROCEDURE — 77067 MAMMO DIGITAL SCREENING RIGHT WITH TOMO: ICD-10-PCS | Mod: 26,TXP,, | Performed by: RADIOLOGY

## 2021-03-12 PROCEDURE — 71046 XR CHEST PA AND LATERAL: ICD-10-PCS | Mod: 26,TXP,, | Performed by: RADIOLOGY

## 2021-03-12 PROCEDURE — 72170 X-RAY EXAM OF PELVIS: CPT | Mod: 26,TXP,, | Performed by: RADIOLOGY

## 2021-03-12 PROCEDURE — 99212 OFFICE O/P EST SF 10 MIN: CPT | Mod: PBBFAC,25 | Performed by: OBSTETRICS & GYNECOLOGY

## 2021-03-12 PROCEDURE — 93978 VASCULAR STUDY: CPT | Mod: 26,TXP,, | Performed by: RADIOLOGY

## 2021-03-12 PROCEDURE — 72170 XR PELVIS ROUTINE AP: ICD-10-PCS | Mod: 26,TXP,, | Performed by: RADIOLOGY

## 2021-03-12 PROCEDURE — 93978 US DOPP ILIACS BILATERAL: ICD-10-PCS | Mod: 26,TXP,, | Performed by: RADIOLOGY

## 2021-03-12 PROCEDURE — G0101 PR CA SCREEN;PELVIC/BREAST EXAM: ICD-10-PCS | Mod: S$PBB,,, | Performed by: OBSTETRICS & GYNECOLOGY

## 2021-03-12 RX ORDER — FINASTERIDE 1 MG/1
1 TABLET, FILM COATED ORAL DAILY
COMMUNITY
Start: 2021-02-04

## 2021-03-12 RX ORDER — ZONISAMIDE 100 MG/1
CAPSULE ORAL
COMMUNITY
Start: 2021-01-26

## 2021-03-12 RX ORDER — IBUPROFEN 200 MG
CAPSULE ORAL
COMMUNITY
Start: 2020-04-07

## 2021-03-12 RX ORDER — TIZANIDINE 4 MG/1
TABLET ORAL
COMMUNITY

## 2021-03-12 RX ORDER — CLONIDINE HYDROCHLORIDE 0.3 MG/1
TABLET ORAL
COMMUNITY
Start: 2021-01-25

## 2021-03-12 RX ORDER — HYDROCODONE BITARTRATE AND ACETAMINOPHEN 5; 325 MG/1; MG/1
1 TABLET ORAL 2 TIMES DAILY PRN
COMMUNITY
Start: 2021-01-09

## 2021-03-12 RX ORDER — AMMONIUM LACTATE 12 G/100G
LOTION TOPICAL
COMMUNITY
Start: 2021-02-04

## 2021-03-12 RX ORDER — CLINDAMYCIN PHOSPHATE 20 MG/G
CREAM VAGINAL
COMMUNITY

## 2021-03-12 RX ORDER — ONDANSETRON 4 MG/1
TABLET, FILM COATED ORAL
COMMUNITY
Start: 2020-12-15

## 2021-03-12 RX ORDER — LANCETS
EACH MISCELLANEOUS
COMMUNITY
Start: 2020-04-07

## 2021-03-12 RX ORDER — COLLAGENASE SANTYL 250 [ARB'U]/G
OINTMENT TOPICAL
COMMUNITY

## 2021-03-12 RX ORDER — LINACLOTIDE 145 UG/1
145 CAPSULE, GELATIN COATED ORAL EVERY MORNING
COMMUNITY
Start: 2020-12-17

## 2021-03-12 RX ORDER — BETAMETHASONE DIPROPIONATE 0.5 MG/G
LOTION TOPICAL
COMMUNITY

## 2021-03-12 RX ORDER — LIDOCAINE AND PRILOCAINE 25; 25 MG/G; MG/G
CREAM TOPICAL
COMMUNITY

## 2021-03-12 RX ORDER — MUPIROCIN 20 MG/G
OINTMENT TOPICAL 3 TIMES DAILY
COMMUNITY
Start: 2021-01-29

## 2021-03-12 RX ORDER — ONDANSETRON HYDROCHLORIDE 8 MG/1
TABLET, FILM COATED ORAL
COMMUNITY
Start: 2021-01-14

## 2021-03-12 RX ORDER — DEXTROSE 4 G
TABLET,CHEWABLE ORAL
COMMUNITY
Start: 2020-04-07

## 2021-03-12 RX ORDER — CALCIUM ACETATE 667 MG/1
CAPSULE ORAL
COMMUNITY

## 2021-03-12 RX ORDER — NIFEDIPINE 90 MG/1
TABLET, EXTENDED RELEASE ORAL
COMMUNITY
Start: 2021-01-27

## 2021-03-12 RX ORDER — HYDRALAZINE HYDROCHLORIDE 50 MG/1
50 TABLET, FILM COATED ORAL 3 TIMES DAILY
COMMUNITY
Start: 2021-01-26

## 2021-03-12 RX ORDER — BETAMETHASONE DIPROPIONATE 0.5 MG/ML
LOTION, AUGMENTED TOPICAL
COMMUNITY

## 2021-03-12 RX ORDER — FLUOCINONIDE 0.5 MG/G
OINTMENT TOPICAL
COMMUNITY

## 2021-03-12 RX ORDER — CELECOXIB 200 MG/1
CAPSULE ORAL
COMMUNITY

## 2021-03-12 RX ORDER — INSULIN GLARGINE 300 U/ML
INJECTION, SOLUTION SUBCUTANEOUS
COMMUNITY
Start: 2021-01-26

## 2021-03-12 RX ORDER — CYCLOBENZAPRINE HCL 5 MG
TABLET ORAL
COMMUNITY
Start: 2020-05-20

## 2021-03-12 RX ORDER — LOSARTAN POTASSIUM 50 MG/1
50 TABLET ORAL DAILY
COMMUNITY
Start: 2021-02-23

## 2021-03-12 RX ORDER — HYDROXYZINE HYDROCHLORIDE 25 MG/1
TABLET, FILM COATED ORAL
COMMUNITY
Start: 2020-05-20

## 2021-03-12 RX ORDER — DICLOFENAC SODIUM 10 MG/G
GEL TOPICAL
COMMUNITY

## 2021-03-12 RX ORDER — FLUTICASONE PROPIONATE 50 MCG
SPRAY, SUSPENSION (ML) NASAL
COMMUNITY

## 2021-03-20 LAB
CLINICAL INFO: NORMAL
CYTO CVX: NORMAL
CYTOLOGIST CVX/VAG CYTO: NORMAL
CYTOLOGY CMNT CVX/VAG CYTO-IMP: NORMAL
CYTOLOGY PAP THIN PREP EXPLANATION: NORMAL
DATE OF PREVIOUS PAP: NORMAL
DATE PREVIOUS BX: NO
HPV I/H RISK 4 DNA CVX QL NAA+PROBE: NOT DETECTED
LMP START DATE: NORMAL
SPECIMEN SOURCE CVX/VAG CYTO: NORMAL
STAT OF ADQ CVX/VAG CYTO-IMP: NORMAL

## 2021-03-24 ENCOUNTER — TELEPHONE (OUTPATIENT)
Dept: CARDIOLOGY | Facility: CLINIC | Age: 71
End: 2021-03-24

## 2021-03-26 ENCOUNTER — TELEPHONE (OUTPATIENT)
Dept: TRANSPLANT | Facility: CLINIC | Age: 71
End: 2021-03-26

## 2021-04-14 ENCOUNTER — TELEPHONE (OUTPATIENT)
Dept: TRANSPLANT | Facility: CLINIC | Age: 71
End: 2021-04-14

## 2021-06-02 ENCOUNTER — TELEPHONE (OUTPATIENT)
Dept: CARDIOLOGY | Facility: CLINIC | Age: 71
End: 2021-06-02

## 2021-06-04 ENCOUNTER — OFFICE VISIT (OUTPATIENT)
Dept: TRANSPLANT | Facility: CLINIC | Age: 71
End: 2021-06-04
Payer: MEDICARE

## 2021-06-04 ENCOUNTER — HOSPITAL ENCOUNTER (OUTPATIENT)
Dept: CARDIOLOGY | Facility: HOSPITAL | Age: 71
Discharge: HOME | End: 2021-06-04
Attending: NURSE PRACTITIONER
Payer: MEDICARE

## 2021-06-04 VITALS
SYSTOLIC BLOOD PRESSURE: 179 MMHG | RESPIRATION RATE: 16 BRPM | BODY MASS INDEX: 33.76 KG/M2 | TEMPERATURE: 98 F | WEIGHT: 197.75 LBS | HEART RATE: 86 BPM | OXYGEN SATURATION: 99 % | DIASTOLIC BLOOD PRESSURE: 92 MMHG | HEIGHT: 64 IN

## 2021-06-04 VITALS — WEIGHT: 194 LBS | HEART RATE: 85 BPM | BODY MASS INDEX: 34.38 KG/M2 | HEIGHT: 63 IN

## 2021-06-04 DIAGNOSIS — N18.6 HYPERPARATHYROIDISM DUE TO END STAGE RENAL DISEASE ON DIALYSIS: ICD-10-CM

## 2021-06-04 DIAGNOSIS — N25.81 HYPERPARATHYROIDISM DUE TO END STAGE RENAL DISEASE ON DIALYSIS: ICD-10-CM

## 2021-06-04 DIAGNOSIS — E11.21 TYPE 2 DIABETES MELLITUS WITH DIABETIC NEPHROPATHY, UNSPECIFIED WHETHER LONG TERM INSULIN USE: Chronic | ICD-10-CM

## 2021-06-04 DIAGNOSIS — N18.6 ESRD ON HEMODIALYSIS: ICD-10-CM

## 2021-06-04 DIAGNOSIS — Z76.82 ORGAN TRANSPLANT CANDIDATE: ICD-10-CM

## 2021-06-04 DIAGNOSIS — Z99.2 HYPERPARATHYROIDISM DUE TO END STAGE RENAL DISEASE ON DIALYSIS: ICD-10-CM

## 2021-06-04 DIAGNOSIS — I15.0 RENOVASCULAR HYPERTENSION: ICD-10-CM

## 2021-06-04 DIAGNOSIS — Z76.82 ORGAN TRANSPLANT CANDIDATE: Primary | ICD-10-CM

## 2021-06-04 DIAGNOSIS — E78.5 HYPERLIPIDEMIA, UNSPECIFIED HYPERLIPIDEMIA TYPE: ICD-10-CM

## 2021-06-04 DIAGNOSIS — Z99.2 ESRD ON HEMODIALYSIS: ICD-10-CM

## 2021-06-04 LAB
ASCENDING AORTA: 2.7 CM
AV INDEX (PROSTH): 0.84
AV MEAN GRADIENT: 5 MMHG
AV PEAK GRADIENT: 9 MMHG
AV VALVE AREA: 2.63 CM2
AV VELOCITY RATIO: 0.93
BSA FOR ECHO PROCEDURE: 1.98 M2
CV ECHO LV RWT: 0.43 CM
CV PHARM DOSE: 0.4 MG
CV STRESS BASE HR: 82 BPM
DIASTOLIC BLOOD PRESSURE: 61 MMHG
DOP CALC AO PEAK VEL: 1.47 M/S
DOP CALC AO VTI: 33.44 CM
DOP CALC LVOT AREA: 3.1 CM2
DOP CALC LVOT DIAMETER: 2 CM
DOP CALC LVOT PEAK VEL: 1.37 M/S
DOP CALC LVOT STROKE VOLUME: 88.08 CM3
DOP CALCLVOT PEAK VEL VTI: 28.05 CM
E WAVE DECELERATION TIME: 193.18 MSEC
E/A RATIO: 0.89
E/E' RATIO: 17.54 M/S
ECHO LV POSTERIOR WALL: 0.94 CM (ref 0.6–1.1)
EJECTION FRACTION: 65 %
END DIASTOLIC INDEX-HIGH: 170 ML/M2
END SYSTOLIC INDEX-HIGH: 70 ML/M2
FRACTIONAL SHORTENING: 33 % (ref 28–44)
INTERVENTRICULAR SEPTUM: 0.96 CM (ref 0.6–1.1)
LA MAJOR: 4.86 CM
LA MINOR: 4.67 CM
LA WIDTH: 4.03 CM
LEFT ATRIUM SIZE: 3.4 CM
LEFT ATRIUM VOLUME INDEX MOD: 27.3 ML/M2
LEFT ATRIUM VOLUME INDEX: 29 ML/M2
LEFT ATRIUM VOLUME MOD: 52.05 CM3
LEFT ATRIUM VOLUME: 55.47 CM3
LEFT INTERNAL DIMENSION IN SYSTOLE: 2.97 CM (ref 2.1–4)
LEFT VENTRICLE DIASTOLIC VOLUME INDEX: 46.05 ML/M2
LEFT VENTRICLE DIASTOLIC VOLUME: 87.96 ML
LEFT VENTRICLE MASS INDEX: 72 G/M2
LEFT VENTRICLE SYSTOLIC VOLUME INDEX: 17.9 ML/M2
LEFT VENTRICLE SYSTOLIC VOLUME: 34.14 ML
LEFT VENTRICULAR INTERNAL DIMENSION IN DIASTOLE: 4.41 CM (ref 3.5–6)
LEFT VENTRICULAR MASS: 138.27 G
LV LATERAL E/E' RATIO: 16.29 M/S
LV SEPTAL E/E' RATIO: 19 M/S
MV PEAK A VEL: 1.28 M/S
MV PEAK E VEL: 1.14 M/S
MV STENOSIS PRESSURE HALF TIME: 56.02 MS
MV VALVE AREA P 1/2 METHOD: 3.93 CM2
OHS CV CPX 85 PERCENT MAX PREDICTED HEART RATE MALE: 123
OHS CV CPX MAX PREDICTED HEART RATE: 145
OHS CV CPX PATIENT IS FEMALE: 1
OHS CV CPX PATIENT IS MALE: 0
OHS CV CPX PEAK DIASTOLIC BLOOD PRESSURE: 59 MMHG
OHS CV CPX PEAK HEAR RATE: 82 BPM
OHS CV CPX PEAK RATE PRESSURE PRODUCT: 9840
OHS CV CPX PEAK SYSTOLIC BLOOD PRESSURE: 120 MMHG
OHS CV CPX PERCENT MAX PREDICTED HEART RATE ACHIEVED: 56
OHS CV CPX RATE PRESSURE PRODUCT PRESENTING: NORMAL
PISA TR MAX VEL: 2.69 M/S
RA MAJOR: 4.28 CM
RA PRESSURE: 3 MMHG
RA WIDTH: 3.47 CM
RETIRED EF AND QEF - SEE NOTES: 51 %
RIGHT VENTRICULAR END-DIASTOLIC DIMENSION: 2.88 CM
RV TISSUE DOPPLER FREE WALL SYSTOLIC VELOCITY 1 (APICAL 4 CHAMBER VIEW): 10.51 CM/S
SINUS: 2.42 CM
STJ: 1.85 CM
SYSTOLIC BLOOD PRESSURE: 126 MMHG
TDI LATERAL: 0.07 M/S
TDI SEPTAL: 0.06 M/S
TDI: 0.07 M/S
TR MAX PG: 29 MMHG
TRICUSPID ANNULAR PLANE SYSTOLIC EXCURSION: 1.58 CM
TV REST PULMONARY ARTERY PRESSURE: 32 MMHG

## 2021-06-04 PROCEDURE — 99215 OFFICE O/P EST HI 40 MIN: CPT | Mod: PBBFAC,25,TXP | Performed by: NURSE PRACTITIONER

## 2021-06-04 PROCEDURE — 93306 TTE W/DOPPLER COMPLETE: CPT | Mod: 26,TXP,, | Performed by: INTERNAL MEDICINE

## 2021-06-04 PROCEDURE — 78452 HT MUSCLE IMAGE SPECT MULT: CPT | Mod: 26,TXP,, | Performed by: INTERNAL MEDICINE

## 2021-06-04 PROCEDURE — 93017 CV STRESS TEST TRACING ONLY: CPT | Mod: TXP

## 2021-06-04 PROCEDURE — 93306 ECHO (CUPID ONLY): ICD-10-PCS | Mod: 26,TXP,, | Performed by: INTERNAL MEDICINE

## 2021-06-04 PROCEDURE — 93018 CV STRESS TEST I&R ONLY: CPT | Mod: TXP,,, | Performed by: INTERNAL MEDICINE

## 2021-06-04 PROCEDURE — 93306 TTE W/DOPPLER COMPLETE: CPT | Mod: TXP

## 2021-06-04 PROCEDURE — 99999 PR PBB SHADOW E&M-EST. PATIENT-LVL V: CPT | Mod: PBBFAC,TXP,, | Performed by: NURSE PRACTITIONER

## 2021-06-04 PROCEDURE — 93016 STRESS TEST WITH MYOCARDIAL PERFUSION (CUPID ONLY): ICD-10-PCS | Mod: TXP,,, | Performed by: INTERNAL MEDICINE

## 2021-06-04 PROCEDURE — 78452 STRESS TEST WITH MYOCARDIAL PERFUSION (CUPID ONLY): ICD-10-PCS | Mod: 26,TXP,, | Performed by: INTERNAL MEDICINE

## 2021-06-04 PROCEDURE — 63600175 PHARM REV CODE 636 W HCPCS: Mod: TXP | Performed by: NURSE PRACTITIONER

## 2021-06-04 PROCEDURE — 99214 OFFICE O/P EST MOD 30 MIN: CPT | Mod: S$PBB,TXP,, | Performed by: NURSE PRACTITIONER

## 2021-06-04 PROCEDURE — 93018 STRESS TEST WITH MYOCARDIAL PERFUSION (CUPID ONLY): ICD-10-PCS | Mod: TXP,,, | Performed by: INTERNAL MEDICINE

## 2021-06-04 PROCEDURE — A9502 TC99M TETROFOSMIN: HCPCS | Mod: TXP

## 2021-06-04 PROCEDURE — 93016 CV STRESS TEST SUPVJ ONLY: CPT | Mod: TXP,,, | Performed by: INTERNAL MEDICINE

## 2021-06-04 PROCEDURE — 99214 PR OFFICE/OUTPT VISIT, EST, LEVL IV, 30-39 MIN: ICD-10-PCS | Mod: S$PBB,TXP,, | Performed by: NURSE PRACTITIONER

## 2021-06-04 PROCEDURE — 99999 PR PBB SHADOW E&M-EST. PATIENT-LVL V: ICD-10-PCS | Mod: PBBFAC,TXP,, | Performed by: NURSE PRACTITIONER

## 2021-06-04 RX ORDER — REGADENOSON 0.08 MG/ML
0.4 INJECTION, SOLUTION INTRAVENOUS ONCE
Status: COMPLETED | OUTPATIENT
Start: 2021-06-04 | End: 2021-06-04

## 2021-06-04 RX ADMIN — REGADENOSON 0.4 MG: 0.08 INJECTION, SOLUTION INTRAVENOUS at 10:06

## 2021-06-25 ENCOUNTER — COMMITTEE REVIEW (OUTPATIENT)
Dept: TRANSPLANT | Facility: CLINIC | Age: 71
End: 2021-06-25

## 2021-07-01 ENCOUNTER — DOCUMENTATION ONLY (OUTPATIENT)
Dept: TRANSPLANT | Facility: CLINIC | Age: 71
End: 2021-07-01

## 2021-07-02 PROCEDURE — 86833 HLA CLASS II HIGH DEFIN QUAL: CPT | Mod: PO | Performed by: NURSE PRACTITIONER

## 2021-07-02 PROCEDURE — 86832 HLA CLASS I HIGH DEFIN QUAL: CPT | Mod: PO | Performed by: NURSE PRACTITIONER

## 2021-07-07 ENCOUNTER — LAB VISIT (OUTPATIENT)
Dept: LAB | Facility: HOSPITAL | Age: 71
End: 2021-07-07
Payer: MEDICARE

## 2021-07-07 DIAGNOSIS — Z76.82 ORGAN TRANSPLANT CANDIDATE: ICD-10-CM

## 2021-07-14 LAB — HPRA INTERPRETATION: NORMAL

## 2021-07-28 LAB
CLASS I ANTIBODIES - LUMINEX: NORMAL
CLASS I ANTIBODY COMMENTS - LUMINEX: NORMAL
CLASS II ANTIBODIES - LUMINEX: NEGATIVE
CPRA %: 31
SERUM COLLECTION DT - LUMINEX CLASS I: NORMAL
SERUM COLLECTION DT - LUMINEX CLASS II: NORMAL
SPCL1 TESTING DATE: NORMAL
SPCL2 TESTING DATE: NORMAL
SPCLU TESTING DATE: NORMAL